# Patient Record
Sex: FEMALE | Race: WHITE | NOT HISPANIC OR LATINO | Employment: OTHER | URBAN - METROPOLITAN AREA
[De-identification: names, ages, dates, MRNs, and addresses within clinical notes are randomized per-mention and may not be internally consistent; named-entity substitution may affect disease eponyms.]

---

## 2017-06-27 ENCOUNTER — OFFICE VISIT (OUTPATIENT)
Dept: HEMATOLOGY/ONCOLOGY | Facility: CLINIC | Age: 57
End: 2017-06-27
Payer: MEDICARE

## 2017-06-27 VITALS
HEIGHT: 60 IN | SYSTOLIC BLOOD PRESSURE: 118 MMHG | DIASTOLIC BLOOD PRESSURE: 76 MMHG | RESPIRATION RATE: 18 BRPM | BODY MASS INDEX: 29.19 KG/M2 | WEIGHT: 148.69 LBS | TEMPERATURE: 98 F | HEART RATE: 74 BPM

## 2017-06-27 DIAGNOSIS — C34.11 PRIMARY CANCER OF RIGHT UPPER LOBE OF LUNG: Chronic | ICD-10-CM

## 2017-06-27 PROCEDURE — 99204 OFFICE O/P NEW MOD 45 MIN: CPT | Mod: ,,, | Performed by: INTERNAL MEDICINE

## 2017-06-27 RX ORDER — LISINOPRIL 5 MG/1
5 TABLET ORAL DAILY
COMMUNITY
End: 2020-06-03

## 2017-06-27 RX ORDER — LEVOTHYROXINE SODIUM 100 UG/1
100 TABLET ORAL DAILY
COMMUNITY

## 2017-06-27 NOTE — PROGRESS NOTES
Subjective:       Patient ID: Ember Delgado is a 56 y.o. female.    Chief Complaint: No chief complaint on file.      Patient is a 55yo female who was diagnosed with right upper lobe lung cancer in 2010.  She underwent neoadjuvant cis/etop/xrt followed by RUL lobectomy.  She has been disease free since that time and has been getting yearly screening CT scans which are negative.          Past Medical History:   Diagnosis Date    Essential hypertension, malignant     Hypothyroid     Lung cancer        Past Surgical History:   Procedure Laterality Date    APPENDECTOMY      Right upper lobectomy.  Right     TUBAL LIGATION Bilateral        Social History     Social History    Marital status: Single     Spouse name: N/A    Number of children: N/A    Years of education: N/A     Social History Main Topics    Smoking status: Former Smoker     Packs/day: 1.30     Types: Cigarettes     Quit date: 2010    Smokeless tobacco: Never Used    Alcohol use No    Drug use: No    Sexual activity: Not Asked     Other Topics Concern    None     Social History Narrative    None       No family history on file.    Review of patient's allergies indicates:  No Known Allergies    Current Outpatient Prescriptions:     levothyroxine (SYNTHROID) 100 MCG tablet, Take 100 mcg by mouth once daily., Disp: , Rfl:     lisinopril (PRINIVIL,ZESTRIL) 5 MG tablet, Take 5 mg by mouth once daily., Disp: , Rfl:     All medications and past history have been reviewed.    Review of Systems   Constitutional: Negative for activity change, appetite change, diaphoresis, fatigue, fever and unexpected weight change.   HENT: Negative for congestion and hearing loss.    Eyes: Negative for visual disturbance.   Respiratory: Negative for cough, chest tightness and shortness of breath.    Cardiovascular: Negative for chest pain and leg swelling.   Gastrointestinal: Negative for abdominal pain, blood in stool, diarrhea, nausea and vomiting.    Endocrine: Negative for cold intolerance and heat intolerance.   Genitourinary: Negative for difficulty urinating, dysuria and hematuria.   Neurological: Negative for dizziness and headaches.   Hematological: Negative for adenopathy. Does not bruise/bleed easily.   Psychiatric/Behavioral: Negative for behavioral problems.       Objective:        /76   Pulse 74   Temp 98.1 °F (36.7 °C) (Oral)   Resp 18   Ht 5' (1.524 m)   Wt 67.4 kg (148 lb 11.2 oz)   BMI 29.04 kg/m²     Physical Exam   Constitutional: She appears well-developed and well-nourished.   HENT:   Head: Normocephalic and atraumatic.   Right Ear: External ear normal.   Left Ear: External ear normal.   Mouth/Throat: Oropharynx is clear and moist.   Eyes: Conjunctivae are normal. Pupils are equal, round, and reactive to light.   Neck: No tracheal deviation present. No thyromegaly present.   Cardiovascular: Normal rate, regular rhythm and normal heart sounds.    Pulmonary/Chest: Effort normal and breath sounds normal.   Abdominal: Soft. Bowel sounds are normal. She exhibits no distension and no mass. There is no tenderness.   Musculoskeletal: She exhibits no edema.   Neurological:   Neuro intact througout   Skin: No rash noted.   Psychiatric: She has a normal mood and affect. Her behavior is normal. Judgment and thought content normal.         Lab  No results found for this or any previous visit (from the past 336 hour(s)).  CMP  No results found for: NA, K, CL, CO2, GLU, BUN, CREATININE, CALCIUM, PROT, ALBUMIN, BILITOT, ALKPHOS, AST, ALT, ANIONGAP, ESTGFRAFRICA, EGFRNONAA      Specimen (12h ago through future)    None                All lab results and imaging results have been reviewed and discussed with the patient.     Assessment:       1. Primary cancer of right upper lobe of lung      Problem List Items Addressed This Visit     Primary cancer of right upper lobe of lung (Chronic)     Stage IIIA NSCLC with sarcomatoid features.   Received  neoadjuvant Cisplstin/Etop/XRT  Surgery 2/2010. Patient had been in remission since that time.     Patient is disease free at this time and is establishing care.  I would continue to get screening chest CT yearly and exams at the same time.  Patient is at some increased risk for the next 10 years given her cessation of smoking in 2010.  She is doing quite well otherwise.             Other Visit Diagnoses    None.       No matching staging information was found for the patient.      Plan:     I have spent greater than 45 minutes.  in the care of this patient discussing the issues reflected in the assessment and plan. Reviewed all old medical records.   Greater than 50% face to face.  All questions answered to the patients satisfaction.      No Follow-up on file.       The plan was discussed with the patient and all questions/concerns have been answered to the patient's satisfaction.

## 2017-06-27 NOTE — ASSESSMENT & PLAN NOTE
Stage IIIA NSCLC with sarcomatoid features.   Received neoadjuvant Cisplstin/Etop/XRT  Surgery 2/2010. Patient had been in remission since that time.     Patient is disease free at this time and is establishing care.  I would continue to get screening chest CT yearly and exams at the same time.  Patient is at some increased risk for the next 10 years given her cessation of smoking in 2010.  She is doing quite well otherwise.

## 2017-07-11 ENCOUNTER — TELEPHONE (OUTPATIENT)
Dept: HEMATOLOGY/ONCOLOGY | Facility: CLINIC | Age: 57
End: 2017-07-11

## 2017-07-11 NOTE — TELEPHONE ENCOUNTER
----- Message from Magaly Flores sent at 7/11/2017  9:56 AM CDT -----  Contact: [phone]   Pt called in to get results of CT of the chest please call pt at # a [phone]

## 2018-08-20 ENCOUNTER — OFFICE VISIT (OUTPATIENT)
Dept: HEMATOLOGY/ONCOLOGY | Facility: CLINIC | Age: 58
End: 2018-08-20
Payer: MEDICARE

## 2018-08-20 VITALS
WEIGHT: 143.19 LBS | TEMPERATURE: 98 F | HEART RATE: 74 BPM | BODY MASS INDEX: 27.97 KG/M2 | DIASTOLIC BLOOD PRESSURE: 84 MMHG | RESPIRATION RATE: 18 BRPM | SYSTOLIC BLOOD PRESSURE: 129 MMHG

## 2018-08-20 DIAGNOSIS — C34.11 PRIMARY CANCER OF RIGHT UPPER LOBE OF LUNG: Chronic | ICD-10-CM

## 2018-08-20 PROCEDURE — 99213 OFFICE O/P EST LOW 20 MIN: CPT | Mod: ,,, | Performed by: INTERNAL MEDICINE

## 2018-08-20 NOTE — LETTER
August 20, 2018      Jared Leon MD  [address]  [address]  [facility]  [place] [address]           [facility]  [address]  [address]  [place] [address]  Phone: [phone]  Fax: [phone]          Patient: Ember Delgado   MR Number: [id number]   YOB: 1960   Date of Visit: 8/20/2018       Dear Dr. Jared Leon:    Thank you for referring Ember Delgado to me for evaluation. Attached you will find relevant portions of my assessment and plan of care.    If you have questions, please do not hesitate to call me. I look forward to following Ember Delgado along with you.    Sincerely,    Denis Varela MD    Enclosure  CC:  No Recipients    If you would like to receive this communication electronically, please contact [email or web address] or [phone] to request more information on [employer] access.    For providers and/or their staff who would like to refer a patient to [facility], please contact us through our one-stop-shop provider referral line, [facility], at [phone].    If you feel you have received this communication in error or would no longer like to receive these types of communications, please e-mail [email or web address]

## 2018-08-20 NOTE — ASSESSMENT & PLAN NOTE
Patient has been doing well this year but still has some risk given her smoking and cancer history.  Will get a yearly CT of the chest and will call patient with this result.  Will continue yearly follow up and monitoring.  Labs look ok as well.

## 2018-08-20 NOTE — PROGRESS NOTES
PROGRESS NOTE    Subjective:       Patient ID: Ember Delgado is a 57 y.o. female.    Chief Complaint:  Follow-up and Results  lung cancer history.   Primary cancer of right upper lobe of lung (Chronic)        Stage IIIA NSCLC with sarcomatoid features.   Received neoadjuvant Cisplstin/Etop/XRT  Surgery 2/2010. Patient had been in remission since that time.           History of Present Illness:   Ember Delgado is a 57 y.o. female who presents for yearly follow up.         Family and Social history reviewed and is unchanged from 2/27/2017.       ROS:  Review of Systems   Constitutional: Negative for fever.   Respiratory: Negative for shortness of breath.    Cardiovascular: Negative for chest pain and leg swelling.   Gastrointestinal: Negative for abdominal pain and blood in stool.   Genitourinary: Negative for hematuria.   Skin: Negative for rash.          Current Outpatient Medications:     amino acids (AMINO ACID ORAL), Take by mouth., Disp: , Rfl:     levothyroxine (SYNTHROID) 100 MCG tablet, Take 100 mcg by mouth once daily., Disp: , Rfl:     lisinopril (PRINIVIL,ZESTRIL) 5 MG tablet, Take 5 mg by mouth once daily., Disp: , Rfl:     omega-3/dha/epa/dpa/fish oil (OMEGA-3 2100 ORAL), Take by mouth., Disp: , Rfl:     UBIQUINOL, BULK, MISC, by Misc.(Non-Drug; Combo Route) route., Disp: , Rfl:         Objective:       Physical Examination:     /84   Pulse 74   Temp 98.4 °F (36.9 °C)   Resp 18   Wt 65 kg (143 lb 3.2 oz)   BMI 27.97 kg/m²     Physical Exam   Constitutional: She appears well-developed and well-nourished.   HENT:   Head: Normocephalic and atraumatic.   Right Ear: External ear normal.   Left Ear: External ear normal.   Mouth/Throat: Oropharynx is clear and moist.   Eyes: Conjunctivae are normal. Pupils are equal, round, and reactive to light.   Neck: No tracheal deviation present. No thyromegaly present.    Cardiovascular: Normal rate, regular rhythm and normal heart sounds.   Pulmonary/Chest: Effort normal and breath sounds normal.   Abdominal: Soft. Bowel sounds are normal. She exhibits no distension and no mass. There is no tenderness.   Musculoskeletal: She exhibits no edema.   Neurological:   Neuro intact througout   Skin: No rash noted.   Psychiatric: She has a normal mood and affect. Her behavior is normal. Judgment and thought content normal.       Labs:   No results found for this or any previous visit (from the past 336 hour(s)).  CMP  No results found for: NA, K, CL, CO2, GLU, BUN, CREATININE, CALCIUM, PROT, ALBUMIN, BILITOT, ALKPHOS, AST, ALT, ANIONGAP, ESTGFRAFRICA, EGFRNONAA  No results found for: CEA  No results found for: PSA        Assessment/Plan:     Problem List Items Addressed This Visit     Primary cancer of right upper lobe of lung (Chronic)     Patient has been doing well this year but still has some risk given her smoking and cancer history.  Will get a yearly CT of the chest and will call patient with this result.  Will continue yearly follow up and monitoring.  Labs look ok as well.           Relevant Orders    CT Chest Without Contrast          Discussion:     Follow-up in about 1 year (around 8/20/2019).      Electronically signed by Denis Ferrara

## 2019-07-03 ENCOUNTER — TELEPHONE (OUTPATIENT)
Dept: HEMATOLOGY/ONCOLOGY | Facility: CLINIC | Age: 59
End: 2019-07-03

## 2019-07-03 NOTE — TELEPHONE ENCOUNTER
NOTIFIED PT PER DR WRIGHT THAT SCAN IS NORMAL, THERE IS NO ABNORMALITY IN THAT AREA. PT VERBALIZED UNDERSTANDING AND WILL KEEP HER APPT IN AUG 2019 TO SEE DR WRIGHT

## 2019-07-03 NOTE — TELEPHONE ENCOUNTER
----- Message from Kathya Armstrong sent at 7/3/2019 11:05 AM CDT -----  Pt had a CT of the chest  On Monday at [facility] due to a lump on her ribs.  Dr. Leon is on vacation.  Pt wants to see if Dr. Varela can get the results and let her know the results.  She would rather get results from Dr. Varela since he is her cancer dr than Dr. Leon.    #[phone]

## 2019-09-17 ENCOUNTER — OFFICE VISIT (OUTPATIENT)
Dept: HEMATOLOGY/ONCOLOGY | Facility: CLINIC | Age: 59
End: 2019-09-17
Payer: MEDICARE

## 2019-09-17 VITALS
WEIGHT: 122 LBS | BODY MASS INDEX: 23.83 KG/M2 | TEMPERATURE: 98 F | SYSTOLIC BLOOD PRESSURE: 109 MMHG | HEART RATE: 96 BPM | DIASTOLIC BLOOD PRESSURE: 68 MMHG | RESPIRATION RATE: 20 BRPM

## 2019-09-17 DIAGNOSIS — C34.11 PRIMARY CANCER OF RIGHT UPPER LOBE OF LUNG: Chronic | ICD-10-CM

## 2019-09-17 PROCEDURE — 99213 PR OFFICE/OUTPT VISIT, EST, LEVL III, 20-29 MIN: ICD-10-PCS | Mod: S$GLB,,, | Performed by: INTERNAL MEDICINE

## 2019-09-17 PROCEDURE — 3008F PR BODY MASS INDEX (BMI) DOCUMENTED: ICD-10-PCS | Mod: S$GLB,,, | Performed by: INTERNAL MEDICINE

## 2019-09-17 PROCEDURE — 3008F BODY MASS INDEX DOCD: CPT | Mod: S$GLB,,, | Performed by: INTERNAL MEDICINE

## 2019-09-17 PROCEDURE — 99213 OFFICE O/P EST LOW 20 MIN: CPT | Mod: S$GLB,,, | Performed by: INTERNAL MEDICINE

## 2019-09-17 NOTE — ASSESSMENT & PLAN NOTE
Patient is doing well and the lump she feels on her chest does not show as abnormal on CT scan.  I discussed that I would have gotten a CT scan at one year anyway and will continue yearly follow up with scan.  Patient EUGENIO at this time.

## 2019-09-17 NOTE — PROGRESS NOTES
PROGRESS NOTE    Subjective:       Patient ID: Ember Delgado is a 59 y.o. female.    Chief Complaint:  Follow-up  lung cancer history.   Primary cancer of right upper lobe of lung (Chronic)        Stage IIIA NSCLC with sarcomatoid features.   Received neoadjuvant Cisplstin/Etop/XRT  Surgery 2/2010. Patient had been in remission since that time.           History of Present Illness:   Ember Delgado is a 59 y.o. female who presents for yearly follow up.     Patient felt a chest lump in the upper out chest in July this year which prompted chest DT done 8/23.        Chest CT 8/23/2019 negative     Family and Social history reviewed and is unchanged from 2/27/2017.       ROS:  Review of Systems   Constitutional: Negative for fever.   Respiratory: Negative for shortness of breath.    Cardiovascular: Negative for chest pain and leg swelling.   Gastrointestinal: Negative for abdominal pain and blood in stool.   Genitourinary: Negative for hematuria.   Skin: Negative for rash.          Current Outpatient Medications:     amino acids (AMINO ACID ORAL), Take by mouth., Disp: , Rfl:     levothyroxine (SYNTHROID) 100 MCG tablet, Take 100 mcg by mouth once daily., Disp: , Rfl:     lisinopril (PRINIVIL,ZESTRIL) 5 MG tablet, Take 5 mg by mouth once daily., Disp: , Rfl:     omega-3/dha/epa/dpa/fish oil (OMEGA-3 2100 ORAL), Take by mouth., Disp: , Rfl:     UBIQUINOL, BULK, MISC, by Misc.(Non-Drug; Combo Route) route., Disp: , Rfl:         Objective:       Physical Examination:     /68   Pulse 96   Temp 98.4 °F (36.9 °C)   Resp 20   Wt 55.3 kg (122 lb)   BMI 23.83 kg/m²     Physical Exam   Constitutional: She appears well-developed and well-nourished.   HENT:   Head: Normocephalic and atraumatic.   Right Ear: External ear normal.   Left Ear: External ear normal.   Mouth/Throat: Oropharynx is clear and moist.   Eyes: Pupils are equal, round, and  reactive to light. Conjunctivae are normal.   Neck: No tracheal deviation present. No thyromegaly present.   Cardiovascular: Normal rate, regular rhythm and normal heart sounds.   Pulmonary/Chest: Effort normal and breath sounds normal.   Abdominal: Soft. Bowel sounds are normal. She exhibits no distension and no mass. There is no tenderness.   Musculoskeletal: She exhibits no edema.   Neurological:   Neuro intact througout   Skin: No rash noted.   Psychiatric: She has a normal mood and affect. Her behavior is normal. Judgment and thought content normal.       Labs:   No results found for this or any previous visit (from the past 336 hour(s)).  CMP  No results found for: NA, K, CL, CO2, GLU, BUN, CREATININE, CALCIUM, PROT, ALBUMIN, BILITOT, ALKPHOS, AST, ALT, ANIONGAP, ESTGFRAFRICA, EGFRNONAA  No results found for: CEA  No results found for: PSA        Assessment/Plan:     Problem List Items Addressed This Visit     Primary cancer of right upper lobe of lung (Chronic)     Patient is doing well and the lump she feels on her chest does not show as abnormal on CT scan.  I discussed that I would have gotten a CT scan at one year anyway and will continue yearly follow up with scan.  Patient EUGENIO at this time.          Relevant Orders    CT Chest Without Contrast          Discussion:     Follow up in about 1 year (around 9/17/2020).      Electronically signed by Denis Ferrara

## 2019-12-16 ENCOUNTER — TELEPHONE (OUTPATIENT)
Dept: FAMILY MEDICINE | Facility: CLINIC | Age: 59
End: 2019-12-16

## 2019-12-16 RX ORDER — TIZANIDINE 4 MG/1
4 TABLET ORAL DAILY
Qty: 30 TABLET | Refills: 0 | Status: SHIPPED | OUTPATIENT
Start: 2019-12-16 | End: 2019-12-19 | Stop reason: SDUPTHER

## 2019-12-16 NOTE — TELEPHONE ENCOUNTER
We haven't seen this since 2018. Spoke with pt - states she takes it as needed. States she was prescribed Xanax but she does not want to take that. Wants to take the Zanaflex instead, takes it rarely to help her sleep

## 2019-12-16 NOTE — TELEPHONE ENCOUNTER
----- Message from Ashley Alvarado sent at 12/16/2019 10:11 AM CST -----  Patient needs a refill tizanidine sent to [facility] patients call back number [phone]

## 2019-12-19 RX ORDER — TIZANIDINE 4 MG/1
4 TABLET ORAL DAILY
Qty: 30 TABLET | Refills: 0 | Status: SHIPPED | OUTPATIENT
Start: 2019-12-19 | End: 2020-01-18

## 2019-12-19 NOTE — TELEPHONE ENCOUNTER
----- Message from Dona Tiwari sent at 12/19/2019 10:48 AM CST -----  Contact: pt  Pt called a couple days ago to get a refill and the pharmacy still hasn't received it    Tizanidine 2mg    [facility]    [phone]

## 2020-01-14 ENCOUNTER — TELEPHONE (OUTPATIENT)
Dept: FAMILY MEDICINE | Facility: CLINIC | Age: 60
End: 2020-01-14

## 2020-01-14 NOTE — TELEPHONE ENCOUNTER
From action in [facility], results look like a benign complicated cyst is present, recheck in 6mths Diagnostic U/S per Dr. Leon.

## 2020-01-29 ENCOUNTER — TELEPHONE (OUTPATIENT)
Dept: FAMILY MEDICINE | Facility: CLINIC | Age: 60
End: 2020-01-29

## 2020-01-29 NOTE — TELEPHONE ENCOUNTER
Per [facility] action, Dr. Leon states this pt needs f/u right breast us due to cysts this spring. Set out action til then.

## 2020-06-02 ENCOUNTER — TELEPHONE (OUTPATIENT)
Dept: FAMILY MEDICINE | Facility: CLINIC | Age: 60
End: 2020-06-02

## 2020-06-02 NOTE — TELEPHONE ENCOUNTER
----- Message from Jennifer Betancourt sent at 6/2/2020  1:17 PM CDT -----  VM @ 12:21 retunring a call from Katie freire # [phone]

## 2020-06-03 RX ORDER — LANOLIN ALCOHOL/MO/W.PET/CERES
400 CREAM (GRAM) TOPICAL
COMMUNITY
End: 2024-01-26

## 2020-06-03 RX ORDER — MULTIVITAMIN
1 TABLET ORAL
COMMUNITY

## 2020-06-09 ENCOUNTER — OFFICE VISIT (OUTPATIENT)
Dept: FAMILY MEDICINE | Facility: CLINIC | Age: 60
End: 2020-06-09
Payer: MEDICARE

## 2020-06-09 DIAGNOSIS — C34.11 PRIMARY CANCER OF RIGHT UPPER LOBE OF LUNG: Chronic | ICD-10-CM

## 2020-06-09 DIAGNOSIS — I10 ESSENTIAL HYPERTENSION: Primary | ICD-10-CM

## 2020-06-09 DIAGNOSIS — R92.8 ABNORMAL MAMMOGRAM: Primary | ICD-10-CM

## 2020-06-09 DIAGNOSIS — Z12.31 OTHER SCREENING MAMMOGRAM: ICD-10-CM

## 2020-06-09 DIAGNOSIS — E03.9 ACQUIRED HYPOTHYROIDISM: ICD-10-CM

## 2020-06-09 PROCEDURE — 99213 PR OFFICE/OUTPT VISIT, EST, LEVL III, 20-29 MIN: ICD-10-PCS | Mod: 95,,, | Performed by: FAMILY MEDICINE

## 2020-06-09 PROCEDURE — 99213 OFFICE O/P EST LOW 20 MIN: CPT | Mod: 95,,, | Performed by: FAMILY MEDICINE

## 2020-06-09 RX ORDER — LOSARTAN POTASSIUM 25 MG/1
25 TABLET ORAL DAILY
Qty: 90 TABLET | Refills: 3 | Status: SHIPPED | OUTPATIENT
Start: 2020-06-09 | End: 2021-06-14 | Stop reason: SDUPTHER

## 2020-06-09 NOTE — PROGRESS NOTES
Subjective:        The chief complaint leading to consultation is:  Lung cancer follow-up  The patient location is:  Home  Visit type: Virtual visit with synchronous audio/video or audio only  This was a video visit in lieu of in-person visit due to the coronavirus emergency. Patient acknowledged and consented to the video visit encounter.     This 59-year-old female is here for a telemedicine visit for her 6 month checkup.  She has a history of lung carcinoma status post lobectomy.  He follows with Dr. Ferrara yearly for  cancer screening.  She feels well and has been staying active.  Before the COVID virus crisis she was playing volleyball in the league.  Now she walks 1 mi on her 5 acre property at home for exercise.    She denies chest pain shortness of breath or any upper respiratory infection symptoms.    Colonoscopy -2016-Dr. Milner RTC 5 years.    She sees Dr. Ortega for Endocrinology and thyroid management.  She is taking a combination pill of levothyroxine and liothyronine 176/25 mcg 1 p.o. q.day.      Past Surgical History:   Procedure Laterality Date    APPENDECTOMY      Right upper lobectomy.  Right     TUBAL LIGATION Bilateral      Past Medical History:   Diagnosis Date    Essential hypertension, malignant     Hypothyroid     Lung cancer      No family history on file.     Social History:   Marital Status: Significant Other  Alcohol History:  reports that she does not drink alcohol.  Tobacco History:  reports that she quit smoking about 10 years ago. Her smoking use included cigarettes. She smoked 1.30 packs per day. She has never used smokeless tobacco.  Drug History:  reports that she does not use drugs.    Review of patient's allergies indicates:  No Known Allergies    Current Outpatient Medications   Medication Sig Dispense Refill    amino acids (AMINO ACID ORAL) Take by mouth.      levothyroxine (SYNTHROID) 100 MCG tablet Take 100 mcg by mouth once daily.      magnesium oxide  (MAG-OX) 400 mg (241.3 mg magnesium) tablet Take 400 mg by mouth.      multivitamin (THERAGRAN) per tablet Take 1 tablet by mouth.      omega-3/dha/epa/dpa/fish oil (OMEGA-3 2100 ORAL) Take by mouth.      UBIQUINOL, BULK, MISC by Misc.(Non-Drug; Combo Route) route.      losartan (COZAAR) 25 MG tablet Take 1 tablet (25 mg total) by mouth once daily. 90 tablet 3     No current facility-administered medications for this visit.        Review of Systems   Constitutional: Negative for appetite change, chills, fatigue, fever and unexpected weight change.   HENT: Negative for congestion, ear pain, sinus pain, sore throat and trouble swallowing.    Eyes: Negative for pain, discharge and visual disturbance.   Respiratory: Negative for apnea, cough, shortness of breath and wheezing.    Cardiovascular: Negative for chest pain, palpitations and leg swelling.   Gastrointestinal: Negative for abdominal pain, blood in stool, constipation, diarrhea, nausea and vomiting.   Endocrine: Negative for heat intolerance, polydipsia and polyuria.   Genitourinary: Negative for difficulty urinating, dyspareunia, dysuria, frequency, hematuria and menstrual problem.   Musculoskeletal: Negative for arthralgias, back pain, gait problem, joint swelling and myalgias.   Allergic/Immunologic: Negative for environmental allergies, food allergies and immunocompromised state.   Neurological: Negative for dizziness, tremors, seizures, numbness and headaches.   Psychiatric/Behavioral: Negative for behavioral problems, confusion, hallucinations and suicidal ideas. The patient is not nervous/anxious.          Objective:        Physical Exam:   Physical Exam         Assessment:       1. Essential hypertension    2. Other screening mammogram    3. Primary cancer of right upper lobe of lung    4. Acquired hypothyroidism      Plan:   Essential hypertension  -     losartan (COZAAR) 25 MG tablet; Take 1 tablet (25 mg total) by mouth once daily.  Dispense: 90  tablet; Refill: 3  Currently taking losartan 25 mg daily.  Other screening mammogram  -     Mammo Digital Screening Bilat w/ Jeison; Future; Expected date: 06/10/2020  Mammogram ordered  Primary cancer of right upper lobe of lung  -     X-Ray Chest PA And Lateral; Future; Expected date: 06/09/2020   Recommend yearly chest x-ray, see Dr. Ferrara as scheduled this summer  Acquired hypothyroidism  Continue Dr. Andrade stock thyroid preparations 1 tablet daily    No follow-ups on file.    Total time spent with patient:  20 min    Each patient to whom he or she provides medical services by telemedicine is:  (1) informed of the relationship between the physician and patient and the respective role of any other health care provider with respect to management of the patient; and (2) notified that he or she may decline to receive medical services by telemedicine and may withdraw from such care at any time.    This note was created using [employer] voice recognition software that occasionally misinterprets phrases or words.

## 2020-06-11 ENCOUNTER — TELEPHONE (OUTPATIENT)
Dept: FAMILY MEDICINE | Facility: CLINIC | Age: 60
End: 2020-06-11

## 2020-06-11 NOTE — TELEPHONE ENCOUNTER
----- Message from Jared Leon MD sent at 6/9/2020  8:55 PM CDT -----  Call patient.  Chest x-ray mammogram in been ordered she needs a 1 year follow-up

## 2020-07-13 ENCOUNTER — HOSPITAL ENCOUNTER (OUTPATIENT)
Dept: RADIOLOGY | Facility: HOSPITAL | Age: 60
Discharge: HOME | End: 2020-07-13
Attending: FAMILY MEDICINE
Payer: MEDICARE

## 2020-07-13 VITALS — BODY MASS INDEX: 23.94 KG/M2 | WEIGHT: 121.94 LBS | HEIGHT: 60 IN

## 2020-07-13 DIAGNOSIS — R92.8 ABNORMAL MAMMOGRAM: ICD-10-CM

## 2020-07-13 DIAGNOSIS — C34.11 PRIMARY CANCER OF RIGHT UPPER LOBE OF LUNG: Chronic | ICD-10-CM

## 2020-07-13 PROCEDURE — 71046 X-RAY EXAM CHEST 2 VIEWS: CPT | Mod: TC,PO

## 2020-07-13 PROCEDURE — 77066 DX MAMMO INCL CAD BI: CPT | Mod: TC,PO

## 2020-07-13 PROCEDURE — 76642 ULTRASOUND BREAST LIMITED: CPT | Mod: TC,PO,RT

## 2020-07-20 ENCOUNTER — TELEPHONE (OUTPATIENT)
Dept: FAMILY MEDICINE | Facility: CLINIC | Age: 60
End: 2020-07-20

## 2020-07-20 NOTE — TELEPHONE ENCOUNTER
Spoke to patient with results verbatim per Dr Leon. Said that she thinks the radiologist mentioned a 3 month follow-up, not 6 month. Would this be ok? I didn't see on the result where it specified a time frame. Patient also wants to know if Dr Leon thinks she should get biopsy or wait for follow-up ultrasound?

## 2020-07-20 NOTE — TELEPHONE ENCOUNTER
----- Message from Jared Leon MD sent at 7/19/2020  6:30 PM CDT -----  Call patient.  Ultrasound of the right breast shows a stable cyst at 2 o clock . Position.  There is also a new 5 mm cyst is seen in the right breast.  A six-month ultrasound can be done to follow-up this up or you may choose to have a biopsy.

## 2020-07-20 NOTE — PROGRESS NOTES
"Spoke to patient with information verbatim per Dr Leon. Verbalized understanding we will call her in 3 months for repeat ultrasound. Remind me created.    Katie Myrick MA 46 minutes ago (3:12 PM)  Per Dr. Leon, "okay for 3 mo f/u. Lets just do ultrasound and not biopsy yet."      "

## 2020-07-20 NOTE — TELEPHONE ENCOUNTER
----- Message from Jared Leon MD sent at 7/19/2020  6:26 PM CDT -----  Call patient.  Her chest x-ray from July 2 shows no lung tumors or new masses.  Just old postsurgical changes from her prior surgery are seen.

## 2020-07-28 ENCOUNTER — TELEPHONE (OUTPATIENT)
Dept: FAMILY MEDICINE | Facility: CLINIC | Age: 60
End: 2020-07-28

## 2020-07-28 NOTE — TELEPHONE ENCOUNTER
----- Message from Haresh Mariscal sent at 7/28/2020  9:34 AM CDT -----  Regarding: needing callback  Pt is stating that she is unsure if she needing a mammo or u/s for 3months f.u please call pt to verify   Pt [phone]

## 2020-07-28 NOTE — TELEPHONE ENCOUNTER
Spoke with pt, she was confused about the order, I let her know that Dr. Leon wanted an u/s not a biopsy in 3 months. Pt wanted to know what she needs to do and I let her know we put a reminder in her chart for 3 months and we would call her at that time and get her scheduled.

## 2020-10-06 ENCOUNTER — OFFICE VISIT (OUTPATIENT)
Dept: HEMATOLOGY/ONCOLOGY | Facility: CLINIC | Age: 60
End: 2020-10-06
Payer: MEDICARE

## 2020-10-06 VITALS
DIASTOLIC BLOOD PRESSURE: 76 MMHG | RESPIRATION RATE: 18 BRPM | TEMPERATURE: 98 F | SYSTOLIC BLOOD PRESSURE: 112 MMHG | BODY MASS INDEX: 28.14 KG/M2 | WEIGHT: 144.13 LBS | HEART RATE: 80 BPM

## 2020-10-06 DIAGNOSIS — C34.11 PRIMARY CANCER OF RIGHT UPPER LOBE OF LUNG: Chronic | ICD-10-CM

## 2020-10-06 PROCEDURE — 3008F PR BODY MASS INDEX (BMI) DOCUMENTED: ICD-10-PCS | Mod: S$GLB,,, | Performed by: INTERNAL MEDICINE

## 2020-10-06 PROCEDURE — 3078F PR MOST RECENT DIASTOLIC BLOOD PRESSURE < 80 MM HG: ICD-10-PCS | Mod: S$GLB,,, | Performed by: INTERNAL MEDICINE

## 2020-10-06 PROCEDURE — 99213 PR OFFICE/OUTPT VISIT, EST, LEVL III, 20-29 MIN: ICD-10-PCS | Mod: S$GLB,,, | Performed by: INTERNAL MEDICINE

## 2020-10-06 PROCEDURE — 3078F DIAST BP <80 MM HG: CPT | Mod: S$GLB,,, | Performed by: INTERNAL MEDICINE

## 2020-10-06 PROCEDURE — 99213 OFFICE O/P EST LOW 20 MIN: CPT | Mod: S$GLB,,, | Performed by: INTERNAL MEDICINE

## 2020-10-06 PROCEDURE — 3074F SYST BP LT 130 MM HG: CPT | Mod: S$GLB,,, | Performed by: INTERNAL MEDICINE

## 2020-10-06 PROCEDURE — 3074F PR MOST RECENT SYSTOLIC BLOOD PRESSURE < 130 MM HG: ICD-10-PCS | Mod: S$GLB,,, | Performed by: INTERNAL MEDICINE

## 2020-10-06 PROCEDURE — 3008F BODY MASS INDEX DOCD: CPT | Mod: S$GLB,,, | Performed by: INTERNAL MEDICINE

## 2020-10-06 NOTE — ASSESSMENT & PLAN NOTE
Patient is doing well and appears EUGENIO.  She stopped smoking in 2009 and did not resume.  I discussed that she should still be getting yearly imaging and will order this today.  Will continue to see her yearly.

## 2020-10-06 NOTE — PROGRESS NOTES
PROGRESS NOTE    Subjective:       Patient ID: Ember Delgado is a 60 y.o. female.    Chief Complaint:  No chief complaint on file.  lung cancer history.   Primary cancer of right upper lobe of lung (Chronic)        Stage IIIA NSCLC with sarcomatoid features.   Received neoadjuvant Cisplstin/Etop/XRT  Surgery 2/2010. Patient had been in remission since that time.           History of Present Illness:   Ember Delgado is a 60 y.o. female who presents for yearly follow up.         Patient has no new complaints at this time.  Feeling well.       CXR negative 7/13/2020    Family and Social history reviewed and is unchanged from 2/27/2017.       ROS:  Review of Systems   Constitutional: Negative for fever.   Respiratory: Negative for shortness of breath.    Cardiovascular: Negative for chest pain and leg swelling.   Gastrointestinal: Negative for abdominal pain and blood in stool.   Genitourinary: Negative for hematuria.   Skin: Negative for rash.          Current Outpatient Medications:     amino acids (AMINO ACID ORAL), Take by mouth., Disp: , Rfl:     levothyroxine (SYNTHROID) 100 MCG tablet, Take 100 mcg by mouth once daily., Disp: , Rfl:     losartan (COZAAR) 25 MG tablet, Take 1 tablet (25 mg total) by mouth once daily., Disp: 90 tablet, Rfl: 3    magnesium oxide (MAG-OX) 400 mg (241.3 mg magnesium) tablet, Take 400 mg by mouth., Disp: , Rfl:     multivitamin (THERAGRAN) per tablet, Take 1 tablet by mouth., Disp: , Rfl:     omega-3/dha/epa/dpa/fish oil (OMEGA-3 2100 ORAL), Take by mouth., Disp: , Rfl:     UBIQUINOL, BULK, MISC, by Misc.(Non-Drug; Combo Route) route., Disp: , Rfl:         Objective:       Physical Examination:     /76   Pulse 80   Temp 97.9 °F (36.6 °C)   Resp 18   Wt 65.4 kg (144 lb 1.6 oz)   BMI 28.14 kg/m²     Physical Exam  Constitutional:       Appearance: She is well-developed.   HENT:      Head:  Normocephalic and atraumatic.      Right Ear: External ear normal.      Left Ear: External ear normal.   Eyes:      Conjunctiva/sclera: Conjunctivae normal.      Pupils: Pupils are equal, round, and reactive to light.   Neck:      Thyroid: No thyromegaly.      Trachea: No tracheal deviation.   Cardiovascular:      Rate and Rhythm: Normal rate and regular rhythm.      Heart sounds: Normal heart sounds.   Pulmonary:      Effort: Pulmonary effort is normal.      Breath sounds: Normal breath sounds.   Abdominal:      General: Bowel sounds are normal. There is no distension.      Palpations: Abdomen is soft. There is no mass.      Tenderness: There is no abdominal tenderness.   Skin:     Findings: No rash.   Neurological:      Comments: Neuro intact througout   Psychiatric:         Behavior: Behavior normal.         Thought Content: Thought content normal.         Judgment: Judgment normal.         Labs:   No results found for this or any previous visit (from the past 336 hour(s)).  CMP  No results found for: NA, K, CL, CO2, GLU, BUN, CREATININE, CALCIUM, PROT, ALBUMIN, BILITOT, ALKPHOS, AST, ALT, ANIONGAP, ESTGFRAFRICA, EGFRNONAA  No results found for: CEA  No results found for: PSA        Assessment/Plan:     Problem List Items Addressed This Visit     Primary cancer of right upper lobe of lung (Chronic)     Patient is doing well and appears EUGENIO.  She stopped smoking in 2009 and did not resume.  I discussed that she should still be getting yearly imaging and will order this today.  Will continue to see her yearly.           Relevant Orders    CT Chest Without Contrast          Discussion:     Follow up in about 1 year (around 10/6/2021).      Electronically signed by Denis Ferrara

## 2020-10-13 ENCOUNTER — TELEPHONE (OUTPATIENT)
Dept: FAMILY MEDICINE | Facility: CLINIC | Age: 60
End: 2020-10-13

## 2020-10-13 DIAGNOSIS — N60.01 CYST OF RIGHT BREAST: Primary | ICD-10-CM

## 2020-10-13 NOTE — TELEPHONE ENCOUNTER
Spoke to patient that repeat breast ultrasound is due and order was being sent to  [facility]. Informed her that if they do not call to schedule in the next couple of days to call them. Gave pt SMI number.

## 2020-10-13 NOTE — TELEPHONE ENCOUNTER
----- Message from [facility],  sent at 7/20/2020  3:57 PM CDT -----  Regarding: Ultrasound due  7/20/20 repeat right breast ultrasound due.

## 2020-11-06 ENCOUNTER — HOSPITAL ENCOUNTER (OUTPATIENT)
Dept: RADIOLOGY | Facility: HOSPITAL | Age: 60
Discharge: HOME | End: 2020-11-06
Attending: FAMILY MEDICINE
Payer: MEDICARE

## 2020-11-06 DIAGNOSIS — N60.01 CYST OF RIGHT BREAST: ICD-10-CM

## 2020-11-06 PROCEDURE — 76642 ULTRASOUND BREAST LIMITED: CPT | Mod: TC,PO,RT

## 2020-11-09 ENCOUNTER — TELEPHONE (OUTPATIENT)
Dept: FAMILY MEDICINE | Facility: CLINIC | Age: 60
End: 2020-11-09

## 2020-11-09 NOTE — TELEPHONE ENCOUNTER
Spoke to patient with results verbatim per Dr Leon. Verbalized understanding that ultrasound will be due with mammogram in July 2021. Remind me created.

## 2020-11-09 NOTE — TELEPHONE ENCOUNTER
----- Message from Jared Leon MD sent at 11/8/2020  8:53 PM CST -----  Call patient.  Breast ultrasound shows a nodule in the right breast but it is smaller than last exam.  Needs repeat ultrasound in July of 2021 after her mammogram at that time.  Probably a benign nodule

## 2020-11-12 ENCOUNTER — HOSPITAL ENCOUNTER (OUTPATIENT)
Dept: RADIOLOGY | Facility: HOSPITAL | Age: 60
Discharge: HOME | End: 2020-11-12
Attending: INTERNAL MEDICINE
Payer: MEDICARE

## 2020-11-12 DIAGNOSIS — C34.11 PRIMARY CANCER OF RIGHT UPPER LOBE OF LUNG: Chronic | ICD-10-CM

## 2020-11-12 PROCEDURE — 71250 CT THORAX DX C-: CPT | Mod: TC,PO

## 2021-02-03 ENCOUNTER — TELEPHONE (OUTPATIENT)
Dept: FAMILY MEDICINE | Facility: CLINIC | Age: 61
End: 2021-02-03

## 2021-02-03 DIAGNOSIS — Z12.11 SPECIAL SCREENING FOR MALIGNANT NEOPLASMS, COLON: Primary | ICD-10-CM

## 2021-05-27 ENCOUNTER — TELEPHONE (OUTPATIENT)
Dept: FAMILY MEDICINE | Facility: CLINIC | Age: 61
End: 2021-05-27

## 2021-05-27 DIAGNOSIS — I10 ESSENTIAL HYPERTENSION: ICD-10-CM

## 2021-05-27 DIAGNOSIS — Z00.00 ROUTINE GENERAL MEDICAL EXAMINATION AT A HEALTH CARE FACILITY: Primary | ICD-10-CM

## 2021-05-27 DIAGNOSIS — Z79.899 ENCOUNTER FOR LONG-TERM (CURRENT) USE OF OTHER MEDICATIONS: ICD-10-CM

## 2021-05-27 DIAGNOSIS — E03.9 ACQUIRED HYPOTHYROIDISM: ICD-10-CM

## 2021-05-27 DIAGNOSIS — Z12.11 SPECIAL SCREENING FOR MALIGNANT NEOPLASMS, COLON: ICD-10-CM

## 2021-06-14 ENCOUNTER — OFFICE VISIT (OUTPATIENT)
Dept: FAMILY MEDICINE | Facility: CLINIC | Age: 61
End: 2021-06-14
Payer: MEDICARE

## 2021-06-14 ENCOUNTER — TELEPHONE (OUTPATIENT)
Dept: FAMILY MEDICINE | Facility: CLINIC | Age: 61
End: 2021-06-14

## 2021-06-14 VITALS
DIASTOLIC BLOOD PRESSURE: 82 MMHG | HEART RATE: 78 BPM | HEIGHT: 60 IN | SYSTOLIC BLOOD PRESSURE: 132 MMHG | BODY MASS INDEX: 28.07 KG/M2 | WEIGHT: 143 LBS

## 2021-06-14 DIAGNOSIS — Z12.31 OTHER SCREENING MAMMOGRAM: ICD-10-CM

## 2021-06-14 DIAGNOSIS — Z12.11 SCREENING FOR COLON CANCER: ICD-10-CM

## 2021-06-14 DIAGNOSIS — Z85.118 HISTORY OF LUNG CANCER: ICD-10-CM

## 2021-06-14 DIAGNOSIS — Z00.00 WELLNESS EXAMINATION: Primary | ICD-10-CM

## 2021-06-14 DIAGNOSIS — E03.9 ACQUIRED HYPOTHYROIDISM: ICD-10-CM

## 2021-06-14 DIAGNOSIS — I10 ESSENTIAL HYPERTENSION: ICD-10-CM

## 2021-06-14 DIAGNOSIS — Z12.39 ENCOUNTER FOR SCREENING FOR MALIGNANT NEOPLASM OF BREAST, UNSPECIFIED SCREENING MODALITY: ICD-10-CM

## 2021-06-14 PROBLEM — C34.11 PRIMARY CANCER OF RIGHT UPPER LOBE OF LUNG: Chronic | Status: RESOLVED | Noted: 2017-06-27 | Resolved: 2021-06-14

## 2021-06-14 PROCEDURE — 99396 PR PREVENTIVE VISIT,EST,40-64: ICD-10-PCS | Mod: S$GLB,,, | Performed by: FAMILY MEDICINE

## 2021-06-14 PROCEDURE — 3075F SYST BP GE 130 - 139MM HG: CPT | Mod: S$GLB,,, | Performed by: FAMILY MEDICINE

## 2021-06-14 PROCEDURE — 3079F DIAST BP 80-89 MM HG: CPT | Mod: S$GLB,,, | Performed by: FAMILY MEDICINE

## 2021-06-14 PROCEDURE — 3079F PR MOST RECENT DIASTOLIC BLOOD PRESSURE 80-89 MM HG: ICD-10-PCS | Mod: S$GLB,,, | Performed by: FAMILY MEDICINE

## 2021-06-14 PROCEDURE — 3075F PR MOST RECENT SYSTOLIC BLOOD PRESS GE 130-139MM HG: ICD-10-PCS | Mod: S$GLB,,, | Performed by: FAMILY MEDICINE

## 2021-06-14 PROCEDURE — 99396 PREV VISIT EST AGE 40-64: CPT | Mod: S$GLB,,, | Performed by: FAMILY MEDICINE

## 2021-06-14 PROCEDURE — 3008F PR BODY MASS INDEX (BMI) DOCUMENTED: ICD-10-PCS | Mod: S$GLB,,, | Performed by: FAMILY MEDICINE

## 2021-06-14 PROCEDURE — 3008F BODY MASS INDEX DOCD: CPT | Mod: S$GLB,,, | Performed by: FAMILY MEDICINE

## 2021-06-14 RX ORDER — LOSARTAN POTASSIUM 25 MG/1
25 TABLET ORAL DAILY
Qty: 90 TABLET | Refills: 3 | Status: SHIPPED | OUTPATIENT
Start: 2021-06-14 | End: 2022-06-15 | Stop reason: SDUPTHER

## 2021-06-30 ENCOUNTER — TELEPHONE (OUTPATIENT)
Dept: FAMILY MEDICINE | Facility: CLINIC | Age: 61
End: 2021-06-30

## 2021-06-30 DIAGNOSIS — N60.01 SOLITARY BENIGN CYST OF RIGHT BREAST: Primary | ICD-10-CM

## 2021-06-30 DIAGNOSIS — N60.01 CYST OF RIGHT BREAST: Primary | ICD-10-CM

## 2021-07-28 ENCOUNTER — HOSPITAL ENCOUNTER (OUTPATIENT)
Dept: RADIOLOGY | Facility: HOSPITAL | Age: 61
Discharge: HOME | End: 2021-07-28
Attending: NURSE PRACTITIONER
Payer: MEDICARE

## 2021-07-28 VITALS — BODY MASS INDEX: 28.09 KG/M2 | WEIGHT: 143.06 LBS | HEIGHT: 60 IN

## 2021-07-28 DIAGNOSIS — N60.01 CYST OF RIGHT BREAST: ICD-10-CM

## 2021-07-28 DIAGNOSIS — N60.01 SOLITARY BENIGN CYST OF RIGHT BREAST: ICD-10-CM

## 2021-07-28 PROCEDURE — 77066 DX MAMMO INCL CAD BI: CPT | Mod: TC,PO

## 2021-07-28 PROCEDURE — 76642 ULTRASOUND BREAST LIMITED: CPT | Mod: TC,PO,RT

## 2021-08-02 ENCOUNTER — TELEPHONE (OUTPATIENT)
Dept: FAMILY MEDICINE | Facility: CLINIC | Age: 61
End: 2021-08-02

## 2021-08-09 ENCOUNTER — TELEPHONE (OUTPATIENT)
Dept: FAMILY MEDICINE | Facility: CLINIC | Age: 61
End: 2021-08-09

## 2022-01-06 ENCOUNTER — OFFICE VISIT (OUTPATIENT)
Dept: HEMATOLOGY/ONCOLOGY | Facility: CLINIC | Age: 62
End: 2022-01-06
Payer: MEDICARE

## 2022-01-06 VITALS
WEIGHT: 140 LBS | SYSTOLIC BLOOD PRESSURE: 124 MMHG | HEIGHT: 60 IN | BODY MASS INDEX: 27.48 KG/M2 | HEART RATE: 76 BPM | RESPIRATION RATE: 18 BRPM | DIASTOLIC BLOOD PRESSURE: 78 MMHG

## 2022-01-06 DIAGNOSIS — Z85.118 HISTORY OF LUNG CANCER: ICD-10-CM

## 2022-01-06 PROCEDURE — 3008F BODY MASS INDEX DOCD: CPT | Mod: S$GLB,,, | Performed by: INTERNAL MEDICINE

## 2022-01-06 PROCEDURE — 99213 PR OFFICE/OUTPT VISIT, EST, LEVL III, 20-29 MIN: ICD-10-PCS | Mod: S$GLB,,, | Performed by: INTERNAL MEDICINE

## 2022-01-06 PROCEDURE — 3074F SYST BP LT 130 MM HG: CPT | Mod: S$GLB,,, | Performed by: INTERNAL MEDICINE

## 2022-01-06 PROCEDURE — 3078F PR MOST RECENT DIASTOLIC BLOOD PRESSURE < 80 MM HG: ICD-10-PCS | Mod: S$GLB,,, | Performed by: INTERNAL MEDICINE

## 2022-01-06 PROCEDURE — 99213 OFFICE O/P EST LOW 20 MIN: CPT | Mod: S$GLB,,, | Performed by: INTERNAL MEDICINE

## 2022-01-06 PROCEDURE — 3008F PR BODY MASS INDEX (BMI) DOCUMENTED: ICD-10-PCS | Mod: S$GLB,,, | Performed by: INTERNAL MEDICINE

## 2022-01-06 PROCEDURE — 3078F DIAST BP <80 MM HG: CPT | Mod: S$GLB,,, | Performed by: INTERNAL MEDICINE

## 2022-01-06 PROCEDURE — 3074F PR MOST RECENT SYSTOLIC BLOOD PRESSURE < 130 MM HG: ICD-10-PCS | Mod: S$GLB,,, | Performed by: INTERNAL MEDICINE

## 2022-01-06 NOTE — PROGRESS NOTES
PROGRESS NOTE    Subjective:       Patient ID: Ember Delgado is a 61 y.o. female.    Chief Complaint:  No chief complaint on file.  lung cancer history.   Primary cancer of right upper lobe of lung (Chronic)        Stage IIIA NSCLC with sarcomatoid features.   Received neoadjuvant Cisplstin/Etop/XRT  Surgery 2/2010. Patient had been in remission since that time.           History of Present Illness:   Ember Delgado is a 61 y.o. female who presents for yearly follow up.     Ms. Delgado has no new complaints at this time.  Feeling ok.    She does note that she has recurrent issues with back spasms.  This is not a new complaint however.     CT chest:  11/12/2020 Neg    Family and Social history reviewed and is unchanged from 2/27/2017.       ROS:  Review of Systems   Constitutional: Negative for fever.   Respiratory: Negative for shortness of breath.    Cardiovascular: Negative for chest pain and leg swelling.   Gastrointestinal: Negative for abdominal pain and blood in stool.   Genitourinary: Negative for hematuria.   Skin: Negative for rash.          Current Outpatient Medications:     amino acids (AMINO ACID ORAL), Take by mouth., Disp: , Rfl:     levothyroxine (SYNTHROID) 100 MCG tablet, Take 100 mcg by mouth once daily., Disp: , Rfl:     losartan (COZAAR) 25 MG tablet, Take 1 tablet (25 mg total) by mouth once daily., Disp: 90 tablet, Rfl: 3    magnesium oxide (MAG-OX) 400 mg (241.3 mg magnesium) tablet, Take 400 mg by mouth., Disp: , Rfl:     multivitamin (THERAGRAN) per tablet, Take 1 tablet by mouth., Disp: , Rfl:     omega-3/dha/epa/dpa/fish oil (OMEGA-3 2100 ORAL), Take by mouth., Disp: , Rfl:     UBIQUINOL, BULK, MISC, by Misc.(Non-Drug; Combo Route) route., Disp: , Rfl:         Objective:       Physical Examination:     /78   Pulse 76   Resp 18   Ht 5' (1.524 m)   Wt 63.5 kg (140 lb)   BMI 27.34 kg/m²     Physical  February 23, 2018      Montse Sarmiento, NP  [address] [facility] [address]           [facility]  [address], [address]  [place] [address]  Phone: [phone]          Patient: Indira Chauhan   MR Number: [id number]   YOB: 1953   Date of Visit: 2/21/2018       Dear Montse Sarmiento:    Thank you for referring Indira Chauhan to me for evaluation. Attached you will find relevant portions of my assessment and plan of care.    If you have questions, please do not hesitate to call me. I look forward to following Indira Chauhan along with you.    Sincerely,    Samira Garcai MD    Enclosure  CC:  No Recipients    If you would like to receive this communication electronically, please contact [email or web address] or [phone] to request more information on [facility] access.    For providers and/or their staff who would like to refer a patient to [facility], please contact us through our one-stop-shop provider referral line, [facility] , at [phone].    If you feel you have received this communication in error or would no longer like to receive these types of communications, please e-mail [email or web address]          Exam  Constitutional:       Appearance: She is well-developed.   HENT:      Head: Normocephalic and atraumatic.      Right Ear: External ear normal.      Left Ear: External ear normal.   Eyes:      Conjunctiva/sclera: Conjunctivae normal.      Pupils: Pupils are equal, round, and reactive to light.   Neck:      Thyroid: No thyromegaly.      Trachea: No tracheal deviation.   Cardiovascular:      Rate and Rhythm: Normal rate and regular rhythm.      Heart sounds: Normal heart sounds.   Pulmonary:      Effort: Pulmonary effort is normal.      Breath sounds: Normal breath sounds.   Abdominal:      General: Bowel sounds are normal. There is no distension.      Palpations: Abdomen is soft. There is no mass.      Tenderness: There is no abdominal tenderness.   Skin:     Findings: No rash.   Neurological:      Comments: Neuro intact througout   Psychiatric:         Behavior: Behavior normal.         Thought Content: Thought content normal.         Judgment: Judgment normal.         Labs:   No results found for this or any previous visit (from the past 336 hour(s)).  CMP  No results found for: NA, K, CL, CO2, GLU, BUN, CREATININE, CALCIUM, PROT, ALBUMIN, BILITOT, ALKPHOS, AST, ALT, ANIONGAP, ESTGFRAFRICA, EGFRNONAA  No results found for: CEA  No results found for: PSA        Assessment/Plan:     Problem List Items Addressed This Visit     History of lung cancer     Patient seems to be doing ok at this time but is due for yearly scan.  Will order this now and discussed this today.  Will call with result and have her back with me yearly.           Relevant Orders    CT Chest Without Contrast    CBC Auto Differential    Comprehensive Metabolic Panel          Discussion:     Follow up in about 1 year (around 1/6/2023).      Electronically signed by Denis Ferrara           28-Jan-2021 17:32

## 2022-02-01 ENCOUNTER — HOSPITAL ENCOUNTER (OUTPATIENT)
Dept: RADIOLOGY | Facility: HOSPITAL | Age: 62
Discharge: HOME | End: 2022-02-01
Attending: INTERNAL MEDICINE
Payer: MEDICARE

## 2022-02-01 DIAGNOSIS — R91.8 ABNORMAL CT SCAN OF LUNG: ICD-10-CM

## 2022-02-01 DIAGNOSIS — Z85.118 HISTORY OF LUNG CANCER: ICD-10-CM

## 2022-02-01 DIAGNOSIS — C34.11 PRIMARY CANCER OF RIGHT UPPER LOBE OF LUNG: Primary | ICD-10-CM

## 2022-02-01 DIAGNOSIS — J18.9 PNEUMONIA DUE TO INFECTIOUS ORGANISM, UNSPECIFIED LATERALITY, UNSPECIFIED PART OF LUNG: ICD-10-CM

## 2022-02-01 PROCEDURE — 71250 CT THORAX DX C-: CPT | Mod: TC,PO

## 2022-02-01 RX ORDER — LEVOFLOXACIN 750 MG/1
750 TABLET ORAL DAILY
Qty: 10 TABLET | Refills: 0 | Status: SHIPPED | OUTPATIENT
Start: 2022-02-01 | End: 2022-02-11

## 2022-05-02 ENCOUNTER — HOSPITAL ENCOUNTER (OUTPATIENT)
Dept: RADIOLOGY | Facility: HOSPITAL | Age: 62
Discharge: HOME | End: 2022-05-02
Attending: NURSE PRACTITIONER
Payer: MEDICARE

## 2022-05-02 DIAGNOSIS — R91.8 ABNORMAL CT SCAN OF LUNG: ICD-10-CM

## 2022-05-02 DIAGNOSIS — C34.11 PRIMARY CANCER OF RIGHT UPPER LOBE OF LUNG: ICD-10-CM

## 2022-05-02 PROCEDURE — 71250 CT THORAX DX C-: CPT | Mod: TC,PO

## 2022-05-10 ENCOUNTER — PATIENT MESSAGE (OUTPATIENT)
Dept: HEMATOLOGY/ONCOLOGY | Facility: CLINIC | Age: 62
End: 2022-05-10

## 2022-05-10 DIAGNOSIS — R91.8 ABNORMAL CT SCAN OF LUNG: ICD-10-CM

## 2022-05-10 DIAGNOSIS — C34.11 PRIMARY CANCER OF RIGHT UPPER LOBE OF LUNG: Primary | ICD-10-CM

## 2022-06-08 ENCOUNTER — PATIENT MESSAGE (OUTPATIENT)
Dept: FAMILY MEDICINE | Facility: CLINIC | Age: 62
End: 2022-06-08

## 2022-06-08 ENCOUNTER — TELEPHONE (OUTPATIENT)
Dept: FAMILY MEDICINE | Facility: CLINIC | Age: 62
End: 2022-06-08

## 2022-06-08 DIAGNOSIS — Z79.899 ENCOUNTER FOR LONG-TERM (CURRENT) USE OF OTHER MEDICATIONS: Primary | ICD-10-CM

## 2022-06-08 DIAGNOSIS — I10 ESSENTIAL HYPERTENSION: ICD-10-CM

## 2022-06-08 DIAGNOSIS — Z00.00 WELLNESS EXAMINATION: ICD-10-CM

## 2022-06-15 ENCOUNTER — PATIENT MESSAGE (OUTPATIENT)
Dept: FAMILY MEDICINE | Facility: CLINIC | Age: 62
End: 2022-06-15

## 2022-06-15 ENCOUNTER — OFFICE VISIT (OUTPATIENT)
Dept: FAMILY MEDICINE | Facility: CLINIC | Age: 62
End: 2022-06-15
Payer: MEDICARE

## 2022-06-15 ENCOUNTER — TELEPHONE (OUTPATIENT)
Dept: FAMILY MEDICINE | Facility: CLINIC | Age: 62
End: 2022-06-15

## 2022-06-15 VITALS
HEIGHT: 60 IN | DIASTOLIC BLOOD PRESSURE: 80 MMHG | WEIGHT: 137 LBS | HEART RATE: 83 BPM | BODY MASS INDEX: 26.9 KG/M2 | SYSTOLIC BLOOD PRESSURE: 112 MMHG

## 2022-06-15 DIAGNOSIS — Z12.31 OTHER SCREENING MAMMOGRAM: ICD-10-CM

## 2022-06-15 DIAGNOSIS — I10 ESSENTIAL HYPERTENSION: ICD-10-CM

## 2022-06-15 DIAGNOSIS — Z85.118 HISTORY OF LUNG CANCER: ICD-10-CM

## 2022-06-15 DIAGNOSIS — Z00.00 WELLNESS EXAMINATION: Primary | ICD-10-CM

## 2022-06-15 DIAGNOSIS — F41.9 ANXIETY: ICD-10-CM

## 2022-06-15 DIAGNOSIS — E03.9 ACQUIRED HYPOTHYROIDISM: ICD-10-CM

## 2022-06-15 DIAGNOSIS — Z78.0 MENOPAUSE: ICD-10-CM

## 2022-06-15 PROCEDURE — 99396 PREV VISIT EST AGE 40-64: CPT | Mod: S$GLB,,, | Performed by: FAMILY MEDICINE

## 2022-06-15 PROCEDURE — 3008F BODY MASS INDEX DOCD: CPT | Mod: CPTII,S$GLB,, | Performed by: FAMILY MEDICINE

## 2022-06-15 PROCEDURE — 1159F PR MEDICATION LIST DOCUMENTED IN MEDICAL RECORD: ICD-10-PCS | Mod: CPTII,S$GLB,, | Performed by: FAMILY MEDICINE

## 2022-06-15 PROCEDURE — 3079F DIAST BP 80-89 MM HG: CPT | Mod: CPTII,S$GLB,, | Performed by: FAMILY MEDICINE

## 2022-06-15 PROCEDURE — 3074F SYST BP LT 130 MM HG: CPT | Mod: CPTII,S$GLB,, | Performed by: FAMILY MEDICINE

## 2022-06-15 PROCEDURE — 3079F PR MOST RECENT DIASTOLIC BLOOD PRESSURE 80-89 MM HG: ICD-10-PCS | Mod: CPTII,S$GLB,, | Performed by: FAMILY MEDICINE

## 2022-06-15 PROCEDURE — 99396 PR PREVENTIVE VISIT,EST,40-64: ICD-10-PCS | Mod: S$GLB,,, | Performed by: FAMILY MEDICINE

## 2022-06-15 PROCEDURE — 3008F PR BODY MASS INDEX (BMI) DOCUMENTED: ICD-10-PCS | Mod: CPTII,S$GLB,, | Performed by: FAMILY MEDICINE

## 2022-06-15 PROCEDURE — 1159F MED LIST DOCD IN RCRD: CPT | Mod: CPTII,S$GLB,, | Performed by: FAMILY MEDICINE

## 2022-06-15 PROCEDURE — 3074F PR MOST RECENT SYSTOLIC BLOOD PRESSURE < 130 MM HG: ICD-10-PCS | Mod: CPTII,S$GLB,, | Performed by: FAMILY MEDICINE

## 2022-06-15 RX ORDER — ESCITALOPRAM OXALATE 10 MG/1
10 TABLET ORAL DAILY
Qty: 30 TABLET | Refills: 2 | Status: SHIPPED | OUTPATIENT
Start: 2022-06-15 | End: 2022-09-15 | Stop reason: SDUPTHER

## 2022-06-15 RX ORDER — LOSARTAN POTASSIUM 25 MG/1
25 TABLET ORAL DAILY
Qty: 90 TABLET | Refills: 3 | Status: SHIPPED | OUTPATIENT
Start: 2022-06-15 | End: 2023-04-10 | Stop reason: SDUPTHER

## 2022-06-15 NOTE — PROGRESS NOTES
SUBJECTIVE:    Patient ID: Ember Delgado is a 61 y.o. female.    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  She notes increased stress in dealing with the customers.  She has not been going to her volleyball games for exercise.  She will walk around her [place] acre land for 20 minutes several days a week.    History of lung cancer and partial lobectomy.  Now followed by Dr. Ferrara who order CT scans of the chest.  She has some ground-glass opacities in the apexes that they are monitoring.    Mammogram due in 2022.    -colonoscopy with Dr. [facility] 5 years       No visits with results within 6 Month(s) from this visit.   Latest known visit with results is:   No results found for any previous visit.       Past Medical History:   Diagnosis Date    Essential hypertension, malignant     Hypothyroid     Lung cancer     Primary cancer of right upper lobe of lung 2017    Stage IIIA NSCLC with sarcomatoid features.  Received neoadjuvant Cisplstin/Etop/XRT Surgery 2010. Patient had been in remission since that time.      Social History     Socioeconomic History    Marital status: Significant Other   Tobacco Use    Smoking status: Former Smoker     Packs/day: 1.30     Types: Cigarettes     Quit date:      Years since quittin.4    Smokeless tobacco: Never Used   Substance and Sexual Activity    Alcohol use: No    Drug use: No     Past Surgical History:   Procedure Laterality Date    APPENDECTOMY      HYSTERECTOMY      Right upper lobectomy.  Right     TUBAL LIGATION Bilateral      No family history on file.    Review of patient's allergies indicates:  No Known Allergies    Current Outpatient Medications:     amino acids (AMINO ACID ORAL), Take by mouth., Disp: , Rfl:     levothyroxine (SYNTHROID) 100 MCG tablet, Take 100 mcg by mouth once daily., Disp: , Rfl:      magnesium oxide (MAG-OX) 400 mg (241.3 mg magnesium) tablet, Take 400 mg by mouth., Disp: , Rfl:     multivitamin (THERAGRAN) per tablet, Take 1 tablet by mouth., Disp: , Rfl:     omega-3/dha/epa/dpa/fish oil (OMEGA-3 2100 ORAL), Take by mouth., Disp: , Rfl:     UBIQUINOL, BULK, MISC, by Misc.(Non-Drug; Combo Route) route., Disp: , Rfl:     EScitalopram oxalate (LEXAPRO) 10 MG tablet, Take 1 tablet (10 mg total) by mouth once daily., Disp: 30 tablet, Rfl: 2    losartan (COZAAR) 25 MG tablet, Take 1 tablet (25 mg total) by mouth once daily., Disp: 90 tablet, Rfl: 3    Review of Systems   Constitutional: Negative for appetite change, chills, fatigue, fever and unexpected weight change.   HENT: Negative for congestion, ear pain, sinus pain, sore throat and trouble swallowing.    Eyes: Negative for pain, discharge and visual disturbance.   Respiratory: Negative for apnea, cough, shortness of breath and wheezing.    Cardiovascular: Negative for chest pain, palpitations and leg swelling.   Gastrointestinal: Negative for abdominal pain, blood in stool, constipation, diarrhea, nausea and vomiting.   Endocrine: Negative for heat intolerance, polydipsia and polyuria.   Genitourinary: Negative for difficulty urinating, dyspareunia, dysuria, frequency, hematuria and menstrual problem.   Musculoskeletal: Positive for arthralgias (hips  ache occas, ). Negative for back pain, gait problem, joint swelling and myalgias.   Allergic/Immunologic: Negative for environmental allergies, food allergies and immunocompromised state.   Neurological: Negative for dizziness, tremors, seizures, numbness and headaches.   Psychiatric/Behavioral: Positive for dysphoric mood. Negative for behavioral problems, confusion, hallucinations and suicidal ideas. The patient is nervous/anxious.           Objective:      Vitals:    06/15/22 0835   BP: 112/80   Pulse: 83   Weight: 62.1 kg (137 lb)   Height: 5' (1.524 m)     Physical Exam  Vitals and  nursing note reviewed.   Constitutional:       General: She is not in acute distress.     Appearance: Normal appearance. She is well-developed. She is not toxic-appearing.   HENT:      Head: Normocephalic and atraumatic.      Right Ear: Tympanic membrane and external ear normal.      Left Ear: Tympanic membrane and external ear normal.      Nose: Nose normal.      Mouth/Throat:      Pharynx: Oropharynx is clear.   Eyes:      Pupils: Pupils are equal, round, and reactive to light.   Neck:      Thyroid: No thyromegaly.      Vascular: No carotid bruit.   Cardiovascular:      Rate and Rhythm: Normal rate and regular rhythm.      Heart sounds: Normal heart sounds. No murmur heard.  Pulmonary:      Effort: Pulmonary effort is normal.      Breath sounds: Normal breath sounds. No wheezing or rales.   Abdominal:      General: Bowel sounds are normal. There is no distension.      Palpations: Abdomen is soft.      Tenderness: There is no abdominal tenderness.   Musculoskeletal:         General: No tenderness or deformity. Normal range of motion.      Cervical back: Normal range of motion and neck supple.      Lumbar back: Normal. No spasms.      Comments: Bends 90 degrees at  Waist,shoulders  And  Knees  Good  rom   Lymphadenopathy:      Cervical: No cervical adenopathy.   Skin:     General: Skin is warm and dry.      Findings: No rash.   Neurological:      Mental Status: She is alert and oriented to person, place, and time.      Cranial Nerves: No cranial nerve deficit.      Coordination: Coordination normal.   Psychiatric:         Behavior: Behavior normal.         Thought Content: Thought content normal.         Judgment: Judgment normal.      Comments: Somewhat depressed and somewhat anxious            Assessment:       1. Wellness examination    2. Other screening mammogram    3. Anxiety    4. Essential hypertension    5. History of lung cancer    6. Acquired hypothyroidism    7. Menopause         Plan:       Wellness  examination  Recommend resuming exercise  Other screening mammogram  -     Mammo Digital Screening Bilat; Future; Expected date: 06/15/2022  Schedule mammogram in August or later  Anxiety  -     EScitalopram oxalate (LEXAPRO) 10 MG tablet; Take 1 tablet (10 mg total) by mouth once daily.  Dispense: 30 tablet; Refill: 2  Trial of Lexapro 10 mg daily for stress and anxiety  Essential hypertension  -     losartan (COZAAR) 25 MG tablet; Take 1 tablet (25 mg total) by mouth once daily.  Dispense: 90 tablet; Refill: 3  Blood pressure well controlled  History of lung cancer  Following with Dr. Ferrara CT scans ordered  Acquired hypothyroidism  Continue levothyroxine  Menopause  Lab work was done with Dr. Ortega and we will obtain the results.    No follow-ups on file.        6/15/2022 Jared Leon

## 2022-06-16 RX ORDER — ROSUVASTATIN CALCIUM 5 MG/1
5 TABLET, COATED ORAL
Qty: 36 TABLET | Refills: 0 | Status: SHIPPED | OUTPATIENT
Start: 2022-06-17 | End: 2022-08-24 | Stop reason: SDUPTHER

## 2022-06-23 ENCOUNTER — TELEPHONE (OUTPATIENT)
Dept: FAMILY MEDICINE | Facility: CLINIC | Age: 62
End: 2022-06-23

## 2022-06-23 NOTE — TELEPHONE ENCOUNTER
----- Message from Jennifer Betancourt sent at 6/23/2022 10:47 AM CDT -----  Pt calling said said the Rosuvastatin Qty is 36 with no refills they are wondering if this is correct thought maybe It was an error and supposed to be qty 30 with 6 refills call the pt to clarify and send a new script  # [phone]

## 2022-06-23 NOTE — TELEPHONE ENCOUNTER
Spoke with pt and clarified per Dr. Leon it is to be taken Monday Wednesday and Friday. We only gave 36 tablets for a 90 day supply with no refills so we can check labs.

## 2022-08-09 ENCOUNTER — HOSPITAL ENCOUNTER (OUTPATIENT)
Dept: RADIOLOGY | Facility: HOSPITAL | Age: 62
Discharge: HOME | End: 2022-08-09
Attending: NURSE PRACTITIONER
Payer: MEDICARE

## 2022-08-09 ENCOUNTER — HOSPITAL ENCOUNTER (OUTPATIENT)
Dept: RADIOLOGY | Facility: HOSPITAL | Age: 62
Discharge: HOME | End: 2022-08-09
Attending: FAMILY MEDICINE
Payer: MEDICARE

## 2022-08-09 VITALS — WEIGHT: 136.88 LBS | BODY MASS INDEX: 26.87 KG/M2 | HEIGHT: 60 IN

## 2022-08-09 DIAGNOSIS — C34.11 PRIMARY CANCER OF RIGHT UPPER LOBE OF LUNG: ICD-10-CM

## 2022-08-09 DIAGNOSIS — Z12.31 OTHER SCREENING MAMMOGRAM: ICD-10-CM

## 2022-08-09 DIAGNOSIS — R91.8 ABNORMAL CT SCAN OF LUNG: ICD-10-CM

## 2022-08-09 PROCEDURE — 77063 BREAST TOMOSYNTHESIS BI: CPT | Mod: TC,PO

## 2022-08-09 PROCEDURE — 77067 SCR MAMMO BI INCL CAD: CPT | Mod: TC,PO

## 2022-08-09 PROCEDURE — 71250 CT THORAX DX C-: CPT | Mod: TC,PO

## 2022-08-15 ENCOUNTER — TELEPHONE (OUTPATIENT)
Dept: FAMILY MEDICINE | Facility: CLINIC | Age: 62
End: 2022-08-15

## 2022-08-15 NOTE — TELEPHONE ENCOUNTER
----- Message from Jared Leon MD sent at 8/14/2022  1:11 PM CDT -----  Call patient.  Mammogram was normal.  Repeat mammogram in 1 year.

## 2022-08-24 RX ORDER — ROSUVASTATIN CALCIUM 5 MG/1
5 TABLET, COATED ORAL
Qty: 36 TABLET | Refills: 0 | Status: SHIPPED | OUTPATIENT
Start: 2022-08-24 | End: 2022-11-04 | Stop reason: SDUPTHER

## 2022-08-24 NOTE — TELEPHONE ENCOUNTER
----- Message from Caro Finnegan sent at 8/24/2022  3:41 PM CDT -----  Refill for Rosuvastatin. [facility]. Pt states that Dr. Jonas's office will be faxing the lab results for you to review.  Pt #[phone]

## 2022-08-31 ENCOUNTER — TELEPHONE (OUTPATIENT)
Dept: HEMATOLOGY/ONCOLOGY | Facility: CLINIC | Age: 62
End: 2022-08-31

## 2022-08-31 NOTE — TELEPHONE ENCOUNTER
Left VM informing pt that CT results are stable and to follow up as scheduled. Call back with any questions.

## 2022-08-31 NOTE — TELEPHONE ENCOUNTER
----- Message from LISSA Ashraf sent at 8/31/2022  3:48 PM CDT -----  Please call and notify patient CT  results are. Continue follow up as scheduled.

## 2022-09-15 ENCOUNTER — TELEPHONE (OUTPATIENT)
Dept: FAMILY MEDICINE | Facility: CLINIC | Age: 62
End: 2022-09-15

## 2022-09-15 DIAGNOSIS — F41.9 ANXIETY: ICD-10-CM

## 2022-09-15 RX ORDER — ESCITALOPRAM OXALATE 10 MG/1
10 TABLET ORAL DAILY
Qty: 30 TABLET | Refills: 2 | Status: SHIPPED | OUTPATIENT
Start: 2022-09-15 | End: 2022-12-19 | Stop reason: SDUPTHER

## 2022-09-15 NOTE — TELEPHONE ENCOUNTER
----- Message from Cintia Dwyer MA sent at 9/15/2022 12:49 PM CDT -----  Pt is calling for a recall on her escitalopram. # [phone]

## 2022-09-15 NOTE — TELEPHONE ENCOUNTER
----- Message from Cintia Dwyer MA sent at 9/15/2022 12:49 PM CDT -----  Pt is calling for a recall on her escitalopram. # [phone]

## 2022-10-04 ENCOUNTER — HOSPITAL ENCOUNTER (OUTPATIENT)
Dept: RADIOLOGY | Facility: HOSPITAL | Age: 62
Discharge: HOME | End: 2022-10-04
Attending: FAMILY MEDICINE
Payer: MEDICARE

## 2022-10-04 ENCOUNTER — OFFICE VISIT (OUTPATIENT)
Dept: FAMILY MEDICINE | Facility: CLINIC | Age: 62
End: 2022-10-04
Payer: MEDICARE

## 2022-10-04 ENCOUNTER — TELEPHONE (OUTPATIENT)
Dept: FAMILY MEDICINE | Facility: CLINIC | Age: 62
End: 2022-10-04

## 2022-10-04 VITALS
SYSTOLIC BLOOD PRESSURE: 138 MMHG | WEIGHT: 146 LBS | BODY MASS INDEX: 28.66 KG/M2 | HEART RATE: 88 BPM | DIASTOLIC BLOOD PRESSURE: 86 MMHG | HEIGHT: 60 IN

## 2022-10-04 DIAGNOSIS — S43.61XA SPRAIN OF RIGHT STERNOCLAVICULAR JOINT, INITIAL ENCOUNTER: Primary | ICD-10-CM

## 2022-10-04 DIAGNOSIS — I10 ESSENTIAL HYPERTENSION: ICD-10-CM

## 2022-10-04 DIAGNOSIS — M25.511 ACUTE PAIN OF RIGHT SHOULDER: Primary | ICD-10-CM

## 2022-10-04 DIAGNOSIS — M25.511 ACUTE PAIN OF RIGHT SHOULDER: ICD-10-CM

## 2022-10-04 DIAGNOSIS — Z85.118 HISTORY OF LUNG CANCER: ICD-10-CM

## 2022-10-04 DIAGNOSIS — Z78.0 MENOPAUSE: ICD-10-CM

## 2022-10-04 DIAGNOSIS — E03.9 ACQUIRED HYPOTHYROIDISM: ICD-10-CM

## 2022-10-04 PROCEDURE — 1159F PR MEDICATION LIST DOCUMENTED IN MEDICAL RECORD: ICD-10-PCS | Mod: CPTII,S$GLB,, | Performed by: FAMILY MEDICINE

## 2022-10-04 PROCEDURE — 3008F PR BODY MASS INDEX (BMI) DOCUMENTED: ICD-10-PCS | Mod: CPTII,S$GLB,, | Performed by: FAMILY MEDICINE

## 2022-10-04 PROCEDURE — 3079F DIAST BP 80-89 MM HG: CPT | Mod: CPTII,S$GLB,, | Performed by: FAMILY MEDICINE

## 2022-10-04 PROCEDURE — 3075F SYST BP GE 130 - 139MM HG: CPT | Mod: CPTII,S$GLB,, | Performed by: FAMILY MEDICINE

## 2022-10-04 PROCEDURE — 99213 PR OFFICE/OUTPT VISIT, EST, LEVL III, 20-29 MIN: ICD-10-PCS | Mod: S$GLB,,, | Performed by: FAMILY MEDICINE

## 2022-10-04 PROCEDURE — 3008F BODY MASS INDEX DOCD: CPT | Mod: CPTII,S$GLB,, | Performed by: FAMILY MEDICINE

## 2022-10-04 PROCEDURE — 3079F PR MOST RECENT DIASTOLIC BLOOD PRESSURE 80-89 MM HG: ICD-10-PCS | Mod: CPTII,S$GLB,, | Performed by: FAMILY MEDICINE

## 2022-10-04 PROCEDURE — 3075F PR MOST RECENT SYSTOLIC BLOOD PRESS GE 130-139MM HG: ICD-10-PCS | Mod: CPTII,S$GLB,, | Performed by: FAMILY MEDICINE

## 2022-10-04 PROCEDURE — 73010 X-RAY EXAM OF SHOULDER BLADE: CPT | Mod: TC,PO,RT

## 2022-10-04 PROCEDURE — 99213 OFFICE O/P EST LOW 20 MIN: CPT | Mod: S$GLB,,, | Performed by: FAMILY MEDICINE

## 2022-10-04 PROCEDURE — 4010F ACE/ARB THERAPY RXD/TAKEN: CPT | Mod: CPTII,S$GLB,, | Performed by: FAMILY MEDICINE

## 2022-10-04 PROCEDURE — 1159F MED LIST DOCD IN RCRD: CPT | Mod: CPTII,S$GLB,, | Performed by: FAMILY MEDICINE

## 2022-10-04 PROCEDURE — 73030 X-RAY EXAM OF SHOULDER: CPT | Mod: TC,PO,RT

## 2022-10-04 PROCEDURE — 4010F PR ACE/ARB THEARPY RXD/TAKEN: ICD-10-PCS | Mod: CPTII,S$GLB,, | Performed by: FAMILY MEDICINE

## 2022-10-04 RX ORDER — CYCLOBENZAPRINE HCL 10 MG
10 TABLET ORAL NIGHTLY
Qty: 20 TABLET | Refills: 0 | Status: SHIPPED | OUTPATIENT
Start: 2022-10-04 | End: 2022-10-14
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
## 2022-10-14 ENCOUNTER — TELEPHONE (OUTPATIENT)
Dept: FAMILY MEDICINE | Facility: CLINIC | Age: 62
End: 2022-10-14

## 2022-10-14 DIAGNOSIS — S43.61XA SPRAIN OF RIGHT STERNOCLAVICULAR JOINT, INITIAL ENCOUNTER: Primary | ICD-10-CM

## 2022-10-14 RX ORDER — HYDROCODONE BITARTRATE AND ACETAMINOPHEN 5; 325 MG/1; MG/1
1 TABLET ORAL EVERY 8 HOURS PRN
Qty: 21 TABLET | Refills: 0 | Status: SHIPPED | OUTPATIENT
Start: 2022-10-14 | End: 2023-06-15
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
## 2022-10-14 NOTE — TELEPHONE ENCOUNTER
----- Message from Bere Collazo sent at 10/14/2022 11:07 AM CDT -----  Pt sleep medication has given her hives. She had some old medication hydrocodone that she has been taking and would like to know if she can have that.   [facility]   [phone]

## 2022-11-04 RX ORDER — ROSUVASTATIN CALCIUM 5 MG/1
5 TABLET, COATED ORAL
Qty: 36 TABLET | Refills: 0 | Status: SHIPPED | OUTPATIENT
Start: 2022-11-04 | End: 2022-12-19 | Stop reason: SDUPTHER

## 2022-12-19 DIAGNOSIS — F41.9 ANXIETY: ICD-10-CM

## 2022-12-19 RX ORDER — ROSUVASTATIN CALCIUM 5 MG/1
5 TABLET, COATED ORAL
Qty: 36 TABLET | Refills: 0 | Status: SHIPPED | OUTPATIENT
Start: 2022-12-19 | End: 2023-03-30 | Stop reason: SDUPTHER

## 2022-12-19 RX ORDER — ESCITALOPRAM OXALATE 10 MG/1
10 TABLET ORAL DAILY
Qty: 30 TABLET | Refills: 2 | Status: SHIPPED | OUTPATIENT
Start: 2022-12-19 | End: 2023-03-30 | Stop reason: SDUPTHER

## 2022-12-22 ENCOUNTER — TELEPHONE (OUTPATIENT)
Dept: HEMATOLOGY/ONCOLOGY | Facility: CLINIC | Age: 62
End: 2022-12-22

## 2022-12-22 NOTE — TELEPHONE ENCOUNTER
Called patient for upcoming appointment and laboratory work required 01/05/2023, patient will fax results.

## 2023-01-05 ENCOUNTER — OFFICE VISIT (OUTPATIENT)
Dept: HEMATOLOGY/ONCOLOGY | Facility: CLINIC | Age: 63
End: 2023-01-05
Payer: MEDICARE

## 2023-01-05 VITALS
WEIGHT: 146.69 LBS | SYSTOLIC BLOOD PRESSURE: 151 MMHG | DIASTOLIC BLOOD PRESSURE: 83 MMHG | HEART RATE: 84 BPM | BODY MASS INDEX: 28.65 KG/M2 | TEMPERATURE: 98 F

## 2023-01-05 DIAGNOSIS — Z85.118 HISTORY OF LUNG CANCER: ICD-10-CM

## 2023-01-05 PROCEDURE — 3079F PR MOST RECENT DIASTOLIC BLOOD PRESSURE 80-89 MM HG: ICD-10-PCS | Mod: CPTII,S$GLB,, | Performed by: INTERNAL MEDICINE

## 2023-01-05 PROCEDURE — 3008F BODY MASS INDEX DOCD: CPT | Mod: CPTII,S$GLB,, | Performed by: INTERNAL MEDICINE

## 2023-01-05 PROCEDURE — 1159F MED LIST DOCD IN RCRD: CPT | Mod: CPTII,S$GLB,, | Performed by: INTERNAL MEDICINE

## 2023-01-05 PROCEDURE — 3008F PR BODY MASS INDEX (BMI) DOCUMENTED: ICD-10-PCS | Mod: CPTII,S$GLB,, | Performed by: INTERNAL MEDICINE

## 2023-01-05 PROCEDURE — 3077F PR MOST RECENT SYSTOLIC BLOOD PRESSURE >= 140 MM HG: ICD-10-PCS | Mod: CPTII,S$GLB,, | Performed by: INTERNAL MEDICINE

## 2023-01-05 PROCEDURE — 3077F SYST BP >= 140 MM HG: CPT | Mod: CPTII,S$GLB,, | Performed by: INTERNAL MEDICINE

## 2023-01-05 PROCEDURE — 3079F DIAST BP 80-89 MM HG: CPT | Mod: CPTII,S$GLB,, | Performed by: INTERNAL MEDICINE

## 2023-01-05 PROCEDURE — 99213 PR OFFICE/OUTPT VISIT, EST, LEVL III, 20-29 MIN: ICD-10-PCS | Mod: S$GLB,,, | Performed by: INTERNAL MEDICINE

## 2023-01-05 PROCEDURE — 99213 OFFICE O/P EST LOW 20 MIN: CPT | Mod: S$GLB,,, | Performed by: INTERNAL MEDICINE

## 2023-01-05 PROCEDURE — 1159F PR MEDICATION LIST DOCUMENTED IN MEDICAL RECORD: ICD-10-PCS | Mod: CPTII,S$GLB,, | Performed by: INTERNAL MEDICINE

## 2023-01-05 NOTE — PROGRESS NOTES
PROGRESS NOTE    Subjective:       Patient ID: Ember Delgado is a 62 y.o. female.    Chief Complaint:  No chief complaint on file.  lung cancer history.   Primary cancer of right upper lobe of lung (Chronic)        Stage IIIA NSCLC with sarcomatoid features.   Received neoadjuvant Cisplstin/Etop/XRT  Surgery 2/2010. Patient had been in remission since that time.           History of Present Illness:   Ember Delgado is a 62 y.o. female who presents for yearly follow up.     Ms. Delgado has no new complaints at this time.  Feeling ok.    She does note that she has recurrent issues with back spasms.  This is not a new complaint however.     CT chest:  11/12/2020 Neg    Family and Social history reviewed and is unchanged from 2/27/2017.       ROS:  Review of Systems   Constitutional:  Negative for appetite change, fever and unexpected weight change.   HENT:  Negative for mouth sores.    Eyes:  Negative for visual disturbance.   Respiratory:  Negative for cough and shortness of breath.    Cardiovascular:  Negative for chest pain and leg swelling.   Gastrointestinal:  Negative for abdominal pain, blood in stool and diarrhea.   Genitourinary:  Negative for frequency and hematuria.   Musculoskeletal:  Negative for back pain.   Skin:  Negative for rash.   Neurological:  Negative for headaches.   Hematological:  Negative for adenopathy.   Psychiatric/Behavioral:  The patient is not nervous/anxious.         Current Outpatient Medications:     amino acids (AMINO ACID ORAL), Take by mouth., Disp: , Rfl:     EScitalopram oxalate (LEXAPRO) 10 MG tablet, Take 1 tablet (10 mg total) by mouth once daily., Disp: 30 tablet, Rfl: 2    HYDROcodone-acetaminophen (NORCO) 5-325 mg per tablet, Take 1 tablet by mouth every 8 (eight) hours as needed for Pain., Disp: 21 tablet, Rfl: 0    levothyroxine (SYNTHROID) 100 MCG tablet, Take 100 mcg by mouth once daily., Disp: ,  Rfl:     losartan (COZAAR) 25 MG tablet, Take 1 tablet (25 mg total) by mouth once daily., Disp: 90 tablet, Rfl: 3    magnesium oxide (MAG-OX) 400 mg (241.3 mg magnesium) tablet, Take 400 mg by mouth., Disp: , Rfl:     multivitamin (THERAGRAN) per tablet, Take 1 tablet by mouth., Disp: , Rfl:     omega-3/dha/epa/dpa/fish oil (OMEGA-3 2100 ORAL), Take by mouth., Disp: , Rfl:     rosuvastatin (CRESTOR) 5 MG tablet, Take 1 tablet (5 mg total) by mouth 3 (three) times a week., Disp: 36 tablet, Rfl: 0    UBIQUINOL, BULK, MISC, by Misc.(Non-Drug; Combo Route) route., Disp: , Rfl:         Objective:       Physical Examination:     BP (!) 151/83   Pulse 84   Temp 98.1 °F (36.7 °C)   Wt 66.5 kg (146 lb 11.2 oz)   BMI 28.65 kg/m²     Physical Exam  Constitutional:       Appearance: She is well-developed.   HENT:      Head: Normocephalic and atraumatic.      Right Ear: External ear normal.      Left Ear: External ear normal.   Eyes:      Conjunctiva/sclera: Conjunctivae normal.      Pupils: Pupils are equal, round, and reactive to light.   Neck:      Thyroid: No thyromegaly.      Trachea: No tracheal deviation.   Cardiovascular:      Rate and Rhythm: Normal rate and regular rhythm.      Heart sounds: Normal heart sounds.   Pulmonary:      Effort: Pulmonary effort is normal.      Breath sounds: Normal breath sounds.   Abdominal:      General: Bowel sounds are normal. There is no distension.      Palpations: Abdomen is soft. There is no mass.      Tenderness: There is no abdominal tenderness.   Skin:     Findings: No rash.   Neurological:      Comments: Neuro intact througout   Psychiatric:         Behavior: Behavior normal.         Thought Content: Thought content normal.         Judgment: Judgment normal.       Labs:   No results found for this or any previous visit (from the past 336 hour(s)).  CMP  No results found for: NA, K, CL, CO2, GLU, BUN, CREATININE, CALCIUM, PROT, ALBUMIN, BILITOT, ALKPHOS, AST, ALT, ANIONGAP,  ESTGFRAFRICA, EGFRNONAA  No results found for: CEA  No results found for: PSA        Assessment/Plan:     Problem List Items Addressed This Visit       History of lung cancer     Patient is doing well.  I discussed the YANN 4mm nodule which has been present since November 2020.  The Feb 2022 CT scan states no changes compared to 11/2020 scan.  No new symptoms at this time and will continue yearly follow up.  Next scan in August 2023.           Relevant Orders    CT Chest Without Contrast         Discussion:     Follow up in about 1 year (around 1/5/2024).      Electronically signed by Denis Ferrara

## 2023-01-05 NOTE — ASSESSMENT & PLAN NOTE
Patient is doing well.  I discussed the YANN 4mm nodule which has been present since November 2020.  The Feb 2022 CT scan states no changes compared to 11/2020 scan.  No new symptoms at this time and will continue yearly follow up.  Next scan in August 2023.

## 2023-03-30 DIAGNOSIS — F41.9 ANXIETY: ICD-10-CM

## 2023-03-30 RX ORDER — ESCITALOPRAM OXALATE 10 MG/1
10 TABLET ORAL DAILY
Qty: 30 TABLET | Refills: 2 | Status: SHIPPED | OUTPATIENT
Start: 2023-03-30 | End: 2023-06-15 | Stop reason: SDUPTHER

## 2023-03-30 RX ORDER — ROSUVASTATIN CALCIUM 5 MG/1
5 TABLET, COATED ORAL
Qty: 36 TABLET | Refills: 0 | Status: SHIPPED | OUTPATIENT
Start: 2023-03-31 | End: 2023-04-10 | Stop reason: SDUPTHER

## 2023-03-30 NOTE — TELEPHONE ENCOUNTER
Spoke to pt and she stated she needs a refill on Lexapro and Crestor.   Last office visit 10/4  Next office visit 6/15

## 2023-03-30 NOTE — TELEPHONE ENCOUNTER
----- Message from Saira Fishman sent at 3/30/2023  2:57 PM CDT -----  Patient called and stated that she need a refill of her anxiety  medicine,and her cholesterol medicine called into [facility] please give her a call at [phone] patient did not know the name of the medicine

## 2023-04-10 DIAGNOSIS — F41.9 ANXIETY: ICD-10-CM

## 2023-04-10 DIAGNOSIS — I10 ESSENTIAL HYPERTENSION: ICD-10-CM

## 2023-04-10 RX ORDER — ROSUVASTATIN CALCIUM 5 MG/1
5 TABLET, COATED ORAL
Qty: 36 TABLET | Refills: 1 | Status: SHIPPED | OUTPATIENT
Start: 2023-04-25 | End: 2023-06-15 | Stop reason: SDUPTHER

## 2023-04-10 RX ORDER — LOSARTAN POTASSIUM 25 MG/1
25 TABLET ORAL DAILY
Qty: 30 TABLET | Refills: 2 | Status: SHIPPED | OUTPATIENT
Start: 2023-04-10 | End: 2023-06-15 | Stop reason: SDUPTHER

## 2023-04-10 NOTE — TELEPHONE ENCOUNTER
----- Message from Kierra Acevedo MA sent at 4/10/2023  8:17 AM CDT -----  Regarding: refill  Patient needs refills on losartan, and all other meds. She does not have meds with her.  [facility]  [phone]  work  [phone] cell

## 2023-04-10 NOTE — TELEPHONE ENCOUNTER
Patient is going out of the country in [place] and need refills on losartan, crestor and lexapro. Pt notified lexapro has 2 refills on it already.    Last ov: 10/4/22   Next ov: 6/15/23  Last written per chart:  6/15/22 & 3/31/23

## 2023-06-12 ENCOUNTER — PATIENT MESSAGE (OUTPATIENT)
Dept: FAMILY MEDICINE | Facility: CLINIC | Age: 63
End: 2023-06-12

## 2023-06-12 ENCOUNTER — TELEPHONE (OUTPATIENT)
Dept: FAMILY MEDICINE | Facility: CLINIC | Age: 63
End: 2023-06-12

## 2023-06-12 DIAGNOSIS — E03.9 ACQUIRED HYPOTHYROIDISM: ICD-10-CM

## 2023-06-12 DIAGNOSIS — I10 ESSENTIAL HYPERTENSION: Primary | ICD-10-CM

## 2023-06-12 DIAGNOSIS — Z79.899 ENCOUNTER FOR LONG-TERM (CURRENT) USE OF OTHER MEDICATIONS: ICD-10-CM

## 2023-06-12 NOTE — TELEPHONE ENCOUNTER
I would call patient first, looks like she gets labs from another provider in media. Not enough time to have labs drawn before OV

## 2023-06-13 ENCOUNTER — PATIENT MESSAGE (OUTPATIENT)
Dept: FAMILY MEDICINE | Facility: CLINIC | Age: 63
End: 2023-06-13

## 2023-06-15 ENCOUNTER — PATIENT MESSAGE (OUTPATIENT)
Dept: FAMILY MEDICINE | Facility: CLINIC | Age: 63
End: 2023-06-15

## 2023-06-15 ENCOUNTER — OFFICE VISIT (OUTPATIENT)
Dept: FAMILY MEDICINE | Facility: CLINIC | Age: 63
End: 2023-06-15
Payer: MEDICARE

## 2023-06-15 VITALS
SYSTOLIC BLOOD PRESSURE: 110 MMHG | WEIGHT: 140 LBS | DIASTOLIC BLOOD PRESSURE: 70 MMHG | HEART RATE: 76 BPM | HEIGHT: 60 IN | BODY MASS INDEX: 27.48 KG/M2

## 2023-06-15 DIAGNOSIS — Z85.118 HISTORY OF LUNG CANCER: ICD-10-CM

## 2023-06-15 DIAGNOSIS — E78.2 MIXED HYPERLIPIDEMIA: ICD-10-CM

## 2023-06-15 DIAGNOSIS — Z23 NEED FOR PNEUMOCOCCAL 20-VALENT CONJUGATE VACCINATION: ICD-10-CM

## 2023-06-15 DIAGNOSIS — F41.9 ANXIETY: ICD-10-CM

## 2023-06-15 DIAGNOSIS — I10 ESSENTIAL HYPERTENSION: Primary | ICD-10-CM

## 2023-06-15 DIAGNOSIS — Z12.31 SCREENING MAMMOGRAM FOR HIGH-RISK PATIENT: ICD-10-CM

## 2023-06-15 DIAGNOSIS — Z87.891 FORMER SMOKER: ICD-10-CM

## 2023-06-15 DIAGNOSIS — R73.9 HYPERGLYCEMIA: ICD-10-CM

## 2023-06-15 PROCEDURE — 3078F PR MOST RECENT DIASTOLIC BLOOD PRESSURE < 80 MM HG: ICD-10-PCS | Mod: CPTII,S$GLB,, | Performed by: FAMILY MEDICINE

## 2023-06-15 PROCEDURE — 1159F PR MEDICATION LIST DOCUMENTED IN MEDICAL RECORD: ICD-10-PCS | Mod: CPTII,S$GLB,, | Performed by: FAMILY MEDICINE

## 2023-06-15 PROCEDURE — 1160F RVW MEDS BY RX/DR IN RCRD: CPT | Mod: CPTII,S$GLB,, | Performed by: FAMILY MEDICINE

## 2023-06-15 PROCEDURE — 99396 PR PREVENTIVE VISIT,EST,40-64: ICD-10-PCS | Mod: S$GLB,,, | Performed by: FAMILY MEDICINE

## 2023-06-15 PROCEDURE — 1159F MED LIST DOCD IN RCRD: CPT | Mod: CPTII,S$GLB,, | Performed by: FAMILY MEDICINE

## 2023-06-15 PROCEDURE — 3074F SYST BP LT 130 MM HG: CPT | Mod: CPTII,S$GLB,, | Performed by: FAMILY MEDICINE

## 2023-06-15 PROCEDURE — 99396 PREV VISIT EST AGE 40-64: CPT | Mod: S$GLB,,, | Performed by: FAMILY MEDICINE

## 2023-06-15 PROCEDURE — 3074F PR MOST RECENT SYSTOLIC BLOOD PRESSURE < 130 MM HG: ICD-10-PCS | Mod: CPTII,S$GLB,, | Performed by: FAMILY MEDICINE

## 2023-06-15 PROCEDURE — 3078F DIAST BP <80 MM HG: CPT | Mod: CPTII,S$GLB,, | Performed by: FAMILY MEDICINE

## 2023-06-15 PROCEDURE — 1160F PR REVIEW ALL MEDS BY PRESCRIBER/CLIN PHARMACIST DOCUMENTED: ICD-10-PCS | Mod: CPTII,S$GLB,, | Performed by: FAMILY MEDICINE

## 2023-06-15 PROCEDURE — 4010F ACE/ARB THERAPY RXD/TAKEN: CPT | Mod: CPTII,S$GLB,, | Performed by: FAMILY MEDICINE

## 2023-06-15 PROCEDURE — 3008F PR BODY MASS INDEX (BMI) DOCUMENTED: ICD-10-PCS | Mod: CPTII,S$GLB,, | Performed by: FAMILY MEDICINE

## 2023-06-15 PROCEDURE — 4010F PR ACE/ARB THEARPY RXD/TAKEN: ICD-10-PCS | Mod: CPTII,S$GLB,, | Performed by: FAMILY MEDICINE

## 2023-06-15 PROCEDURE — 3008F BODY MASS INDEX DOCD: CPT | Mod: CPTII,S$GLB,, | Performed by: FAMILY MEDICINE

## 2023-06-15 RX ORDER — ESCITALOPRAM OXALATE 20 MG/1
20 TABLET ORAL DAILY
Qty: 90 TABLET | Refills: 3 | Status: SHIPPED | OUTPATIENT
Start: 2023-06-15 | End: 2023-06-19 | Stop reason: SDUPTHER

## 2023-06-15 RX ORDER — ROSUVASTATIN CALCIUM 5 MG/1
5 TABLET, COATED ORAL
Qty: 90 TABLET | Refills: 3 | Status: SHIPPED | OUTPATIENT
Start: 2023-06-16 | End: 2025-10-03

## 2023-06-15 RX ORDER — ESCITALOPRAM OXALATE 10 MG/1
10 TABLET ORAL DAILY
Qty: 90 TABLET | Refills: 3 | Status: SHIPPED | OUTPATIENT
Start: 2023-06-15 | End: 2023-06-15

## 2023-06-15 RX ORDER — ROSUVASTATIN CALCIUM 5 MG/1
5 TABLET, COATED ORAL
Qty: 90 TABLET | Refills: 3 | Status: SHIPPED | OUTPATIENT
Start: 2023-06-16 | End: 2023-06-15

## 2023-06-15 RX ORDER — LOSARTAN POTASSIUM 25 MG/1
25 TABLET ORAL DAILY
Qty: 90 TABLET | Refills: 3 | Status: SHIPPED | OUTPATIENT
Start: 2023-06-15 | End: 2023-06-15

## 2023-06-15 RX ORDER — LOSARTAN POTASSIUM 25 MG/1
25 TABLET ORAL DAILY
Qty: 90 TABLET | Refills: 3 | Status: SHIPPED | OUTPATIENT
Start: 2023-06-15 | End: 2024-06-09

## 2023-06-15 NOTE — PROGRESS NOTES
"  SUBJECTIVE:   HPI: Ember Delgado  is a 62 y.o. female who presents for annual physical.    Patient is a 62-year-old female here for an annual exam.  She has no complaints today.  She states she is feeling very well.  Recently returned from a vacation in [place] in [place].  Does report that she picked up a "cold." Reports she had a mild cough that lasted a little more than a week.  She has recovered from this completely.    Patient sees Endocrinology , had labs back in March.  This did show hemoglobin A1c of 6.0.  Endocrinologist discussed plan with patient for "prediabetes." Patient opted to forego medication in lieu of diet and exercise.  Patient reports she has lost a total of 10 lb.  She is following the "sugar busters "diet and walking at least a mile daily.  Labs drawn this morning.    Patient has no concerns regarding her bowel health.  States that she has some occasional stress incontinence but this does not concern her.  States she sleeps well.  On occasion that she does have any trouble sleeping she just utilizes over-the-counter Unisom which works well for her.    Due for mammogram in August, due for colonoscopy in 2026.    Patient has a history of lung cancer, quit smoking in 2010 with diagnosis.  Patient does routinely see Oncology with .  She is an upcoming appointment with him and to have routine CT scan performed for surveillance.    Discussed vaccinations with patient.  Patient states she can not remember the last time she had a tetanus shot.  Does not wish to have a tetanus shot today.  Did discuss with patient incidents that would require her to receive a tetanus shot for her health and safety.  Patient verbalizes understanding.  Patient will discuss Shingrix vaccination with Dr. Ferrara, along with pneumococcal vaccination.      No visits with results within 6 Month(s) from this visit.   Latest known visit with results is:   No results found for any previous " visit.         Current Outpatient Medications on File Prior to Visit   Medication Sig Dispense Refill    amino acids (AMINO ACID ORAL) Take by mouth.      levothyroxine (SYNTHROID) 100 MCG tablet Take 100 mcg by mouth once daily.      magnesium oxide (MAG-OX) 400 mg (241.3 mg magnesium) tablet Take 400 mg by mouth.      multivitamin (THERAGRAN) per tablet Take 1 tablet by mouth.      omega-3/dha/epa/dpa/fish oil (OMEGA-3 2100 ORAL) Take by mouth.      UBIQUINOL, BULK, MISC by Misc.(Non-Drug; Combo Route) route.      [DISCONTINUED] EScitalopram oxalate (LEXAPRO) 10 MG tablet Take 1 tablet (10 mg total) by mouth once daily. 30 tablet 2    [DISCONTINUED] HYDROcodone-acetaminophen (NORCO) 5-325 mg per tablet Take 1 tablet by mouth every 8 (eight) hours as needed for Pain. 21 tablet 0    [DISCONTINUED] losartan (COZAAR) 25 MG tablet Take 1 tablet (25 mg total) by mouth once daily. 30 tablet 2    [DISCONTINUED] rosuvastatin (CRESTOR) 5 MG tablet Take 1 tablet (5 mg total) by mouth 3 (three) times a week. 36 tablet 1     No current facility-administered medications on file prior to visit.     Past Medical History:   Diagnosis Date    Essential hypertension, malignant     Hypothyroid     Lung cancer     Primary cancer of right upper lobe of lung 6/27/2017    Stage IIIA NSCLC with sarcomatoid features.  Received neoadjuvant Cisplstin/Etop/XRT Surgery 2/2010. Patient had been in remission since that time.      @[email or web address]  Past Surgical History:   Procedure Laterality Date    APPENDECTOMY      HYSTERECTOMY      Right upper lobectomy.  Right     TUBAL LIGATION Bilateral      History reviewed. No pertinent family history.    Marital Status: Significant Other  Alcohol History:  reports no history of alcohol use.  Tobacco History:  reports that she quit smoking about 13 years ago. Her smoking use included cigarettes. She smoked an average of 1.3 packs per day. She has never used smokeless tobacco.  Drug History:  reports no  history of drug use.    Health Maintenance Topics with due status: Not Due       Topic Last Completion Date    Influenza Vaccine 12/19/2017    Lipid Panel 11/30/2018    Colorectal Cancer Screening 08/04/2021     Immunization History   Administered Date(s) Administered    COVID-19, MRNA, LN-S, PF (MODERNA FULL 0.5 ML DOSE) 03/03/2021, 03/31/2021, 11/29/2021    Influenza - Quadrivalent - PF *Preferred* (6 months and older) 12/19/2017       Review of patient's allergies indicates:   Allergen Reactions    Flexeril [cyclobenzaprine] Hives       Current Outpatient Medications:     amino acids (AMINO ACID ORAL), Take by mouth., Disp: , Rfl:     EScitalopram oxalate (LEXAPRO) 20 MG tablet, Take 1 tablet (20 mg total) by mouth once daily., Disp: 90 tablet, Rfl: 3    levothyroxine (SYNTHROID) 100 MCG tablet, Take 100 mcg by mouth once daily., Disp: , Rfl:     losartan (COZAAR) 25 MG tablet, Take 1 tablet (25 mg total) by mouth once daily., Disp: 90 tablet, Rfl: 3    magnesium oxide (MAG-OX) 400 mg (241.3 mg magnesium) tablet, Take 400 mg by mouth., Disp: , Rfl:     multivitamin (THERAGRAN) per tablet, Take 1 tablet by mouth., Disp: , Rfl:     omega-3/dha/epa/dpa/fish oil (OMEGA-3 2100 ORAL), Take by mouth., Disp: , Rfl:     [START ON 6/16/2023] rosuvastatin (CRESTOR) 5 MG tablet, Take 1 tablet (5 mg total) by mouth 3 (three) times a week., Disp: 90 tablet, Rfl: 3    UBIQUINOL, BULK, MISC, by Misc.(Non-Drug; Combo Route) route., Disp: , Rfl:     Review of Systems   Constitutional:  Positive for activity change. Negative for appetite change, chills, fatigue, fever and unexpected weight change.        Increased activity   HENT:  Negative for nasal congestion, dental problem, ear pain, sore throat, tinnitus and trouble swallowing.    Eyes:  Negative for pain, discharge, visual disturbance and eye dryness.   Respiratory:  Negative for apnea, cough, shortness of breath and wheezing.    Cardiovascular:  Negative for chest pain,  palpitations and leg swelling.   Gastrointestinal:  Negative for abdominal pain, blood in stool, constipation, diarrhea, nausea, vomiting and reflux.   Genitourinary:  Positive for bladder incontinence. Negative for dysuria, frequency, hematuria, nocturia and urgency.        Occasional mild stress incontinence, patient states she wears a panty liner.   Musculoskeletal:  Negative for arthralgias, back pain, gait problem, myalgias and neck pain.   Integumentary:  Negative for rash and mole/lesion.   Neurological:  Negative for dizziness, tremors, weakness, numbness and headaches.   Hematological:  Does not bruise/bleed easily.   Psychiatric/Behavioral:  Negative for behavioral problems and hallucinations. The patient is not nervous/anxious.      OBJECTIVE:      Vitals:    06/15/23 0823   BP: 110/70   Pulse: 76   Weight: 63.5 kg (140 lb)   Height: 5' (1.524 m)     Physical Exam  Vitals and nursing note reviewed.   Constitutional:       General: She is not in acute distress.     Appearance: Normal appearance. She is well-developed and normal weight.   HENT:      Head: Normocephalic and atraumatic.      Right Ear: External ear normal.      Left Ear: External ear normal.      Nose: Nose normal. No congestion or rhinorrhea.      Mouth/Throat:      Mouth: Mucous membranes are moist.      Pharynx: Oropharynx is clear. No oropharyngeal exudate or posterior oropharyngeal erythema.   Eyes:      General:         Right eye: No discharge.         Left eye: No discharge.      Conjunctiva/sclera: Conjunctivae normal.      Pupils: Pupils are equal, round, and reactive to light.   Neck:      Thyroid: No thyroid mass or thyromegaly.      Vascular: No carotid bruit.   Cardiovascular:      Rate and Rhythm: Normal rate and regular rhythm.      Pulses: Normal pulses.           Radial pulses are 2+ on the right side and 2+ on the left side.      Heart sounds: Normal heart sounds. No murmur heard.  Pulmonary:      Effort: Pulmonary effort is  normal.      Breath sounds: Normal breath sounds and air entry. No wheezing or rales.   Abdominal:      General: Abdomen is flat. Bowel sounds are normal. There is no distension.      Palpations: Abdomen is soft.      Tenderness: There is no abdominal tenderness.   Musculoskeletal:         General: No tenderness or deformity. Normal range of motion.      Cervical back: Full passive range of motion without pain, normal range of motion and neck supple.      Lumbar back: Normal. No spasms.      Right lower leg: No edema.      Left lower leg: No edema.      Comments: Bends 90 degrees at  waist   Lymphadenopathy:      Cervical: No cervical adenopathy.   Skin:     General: Skin is warm and dry.      Capillary Refill: Capillary refill takes less than 2 seconds.      Coloration: Skin is not jaundiced.      Findings: No rash.   Neurological:      General: No focal deficit present.      Mental Status: She is alert and oriented to person, place, and time.      Cranial Nerves: No cranial nerve deficit.      Sensory: Sensation is intact.      Motor: Motor function is intact.      Coordination: Coordination is intact. Coordination normal.      Gait: Gait is intact.   Psychiatric:         Mood and Affect: Mood normal.         Speech: Speech normal.         Behavior: Behavior normal. Behavior is cooperative.         Thought Content: Thought content normal.         Judgment: Judgment normal.      Assessment:       1. Essential hypertension    2. Screening mammogram for high-risk patient    3. Anxiety    4. Mixed hyperlipidemia    5. Hyperglycemia        Plan:       Essential hypertension  Patient asks if she will possibly be able to come off of her blood pressure medication now that she is exercising and losing weight.  Told patient this could be a possibility, and instructed patient to begin checking her blood pressure in the morning before taking her medication and logging these results.  Report these findings and can further  discuss possible medication changes.  -     losartan (COZAAR) 25 MG tablet; Take 1 tablet (25 mg total) by mouth once daily.  Dispense: 90 tablet; Refill: 3    Screening mammogram for high-risk patient  -     Mammo Digital Screening Bilat; Future; Expected date: 08/10/2023    Anxiety  Patient requests increase of escitalopram.  We will change from 10 mg p.o. daily to 20 mg p.o. daily.  -     EScitalopram oxalate (LEXAPRO) 20 MG tablet; Take 1 tablet (20 mg total) by mouth once daily.  Dispense: 90 tablet; Refill: 3    Mixed hyperlipidemia  Continue Crestor 5 mg.  -     rosuvastatin (CRESTOR) 5 MG tablet; Take 1 tablet (5 mg total) by mouth 3 (three) times a week.  Dispense: 90 tablet; Refill: 3    Hyperglycemia  Patient has reduced carbs in diet.  Has been exercising daily.  We will recheck hemoglobin A1c for surveillance.-     Hemoglobin A1C; Future; Expected date: 06/15/2023        Counseled on age and gender appropriate medical preventative services, including cancer screenings, immunizations, overall nutritional health, need for a consistent exercise regimen and an overall push towards maintaining a vigorous and active lifestyle.      Follow up in about 1 year (around 6/15/2024) for ANNUAL.          6/15/2023 Bere Kumari M.D.

## 2023-06-16 LAB
ALBUMIN SERPL-MCNC: 4.5 G/DL (ref 3.6–5.1)
ALBUMIN/CREAT UR: 2 MCG/MG CREAT
ALBUMIN/GLOB SERPL: 2 (CALC) (ref 1–2.5)
ALP SERPL-CCNC: 69 U/L (ref 37–153)
ALT SERPL-CCNC: 14 U/L (ref 6–29)
APPEARANCE UR: CLEAR
AST SERPL-CCNC: 14 U/L (ref 10–35)
BACTERIA #/AREA URNS HPF: NORMAL /HPF
BACTERIA UR CULT: NORMAL
BASOPHILS # BLD AUTO: 38 CELLS/UL (ref 0–200)
BASOPHILS NFR BLD AUTO: 0.6 %
BILIRUB SERPL-MCNC: 0.5 MG/DL (ref 0.2–1.2)
BILIRUB UR QL STRIP: NEGATIVE
BUN SERPL-MCNC: 23 MG/DL (ref 7–25)
BUN/CREAT SERPL: NORMAL (CALC) (ref 6–22)
CALCIUM SERPL-MCNC: 9.2 MG/DL (ref 8.6–10.4)
CHLORIDE SERPL-SCNC: 103 MMOL/L (ref 98–110)
CHOLEST SERPL-MCNC: 195 MG/DL
CHOLEST/HDLC SERPL: 3.4 (CALC)
CO2 SERPL-SCNC: 29 MMOL/L (ref 20–32)
COLOR UR: YELLOW
CREAT SERPL-MCNC: 0.71 MG/DL (ref 0.5–1.05)
CREAT UR-MCNC: 86 MG/DL (ref 20–275)
EGFR: 96 ML/MIN/1.73M2
EOSINOPHIL # BLD AUTO: 290 CELLS/UL (ref 15–500)
EOSINOPHIL NFR BLD AUTO: 4.6 %
ERYTHROCYTE [DISTWIDTH] IN BLOOD BY AUTOMATED COUNT: 13.8 % (ref 11–15)
GLOBULIN SER CALC-MCNC: 2.3 G/DL (CALC) (ref 1.9–3.7)
GLUCOSE SERPL-MCNC: 91 MG/DL (ref 65–99)
GLUCOSE UR QL STRIP: NEGATIVE
HCT VFR BLD AUTO: 42.4 % (ref 35–45)
HDLC SERPL-MCNC: 58 MG/DL
HGB BLD-MCNC: 14 G/DL (ref 11.7–15.5)
HGB UR QL STRIP: NEGATIVE
HYALINE CASTS #/AREA URNS LPF: NORMAL /LPF
KETONES UR QL STRIP: NEGATIVE
LDLC SERPL CALC-MCNC: 119 MG/DL (CALC)
LEUKOCYTE ESTERASE UR QL STRIP: NEGATIVE
LYMPHOCYTES # BLD AUTO: 2048 CELLS/UL (ref 850–3900)
LYMPHOCYTES NFR BLD AUTO: 32.5 %
MCH RBC QN AUTO: 28.5 PG (ref 27–33)
MCHC RBC AUTO-ENTMCNC: 33 G/DL (ref 32–36)
MCV RBC AUTO: 86.2 FL (ref 80–100)
MICROALBUMIN UR-MCNC: 0.2 MG/DL
MONOCYTES # BLD AUTO: 504 CELLS/UL (ref 200–950)
MONOCYTES NFR BLD AUTO: 8 %
NEUTROPHILS # BLD AUTO: 3421 CELLS/UL (ref 1500–7800)
NEUTROPHILS NFR BLD AUTO: 54.3 %
NITRITE UR QL STRIP: NEGATIVE
NONHDLC SERPL-MCNC: 137 MG/DL (CALC)
PH UR STRIP: 6 [PH] (ref 5–8)
PLATELET # BLD AUTO: 258 THOUSAND/UL (ref 140–400)
PMV BLD REES-ECKER: 9.9 FL (ref 7.5–12.5)
POTASSIUM SERPL-SCNC: 5.2 MMOL/L (ref 3.5–5.3)
PROT SERPL-MCNC: 6.8 G/DL (ref 6.1–8.1)
PROT UR QL STRIP: NEGATIVE
RBC # BLD AUTO: 4.92 MILLION/UL (ref 3.8–5.1)
RBC #/AREA URNS HPF: NORMAL /HPF
SERVICE CMNT-IMP: NORMAL
SODIUM SERPL-SCNC: 140 MMOL/L (ref 135–146)
SP GR UR STRIP: 1.02 (ref 1–1.03)
SQUAMOUS #/AREA URNS HPF: NORMAL /HPF
TRIGL SERPL-MCNC: 81 MG/DL
TSH SERPL-ACNC: 0.69 MIU/L (ref 0.4–4.5)
WBC # BLD AUTO: 6.3 THOUSAND/UL (ref 3.8–10.8)
WBC #/AREA URNS HPF: NORMAL /HPF

## 2023-06-16 NOTE — PROGRESS NOTES
Call patient.  All her labs look very normal.  Cholesterol excellent at 195 triglycerides 81.  Sugar kidneys and liver looked normal.  CBC shows no anemia.  Continue current medications

## 2023-06-19 ENCOUNTER — TELEPHONE (OUTPATIENT)
Dept: FAMILY MEDICINE | Facility: CLINIC | Age: 63
End: 2023-06-19

## 2023-06-19 DIAGNOSIS — F41.9 ANXIETY: ICD-10-CM

## 2023-06-19 RX ORDER — ESCITALOPRAM OXALATE 20 MG/1
20 TABLET ORAL DAILY
Qty: 90 TABLET | Refills: 3 | Status: SHIPPED | OUTPATIENT
Start: 2023-06-19 | End: 2024-06-18

## 2023-06-19 NOTE — TELEPHONE ENCOUNTER
Bere Lopez, RT  Caller: Unspecified (Today,  1:08 PM)  Vm- 12:30-pt is calling back   [phone]

## 2023-06-19 NOTE — TELEPHONE ENCOUNTER
Per OV note. Lexapro was increased from 10mg to 20 mg daily. Rx was sent in on 6/15/23.     Pt notified and voiced understanding. States she is not home right now to look at the bottle. She will check and call the pharmacy if there is an issue.

## 2023-06-19 NOTE — TELEPHONE ENCOUNTER
----- Message from [facility], RT sent at 6/19/2023 12:13 PM CDT -----  Said that we were supposed to increase anxiety medication at visit, doesn't know name, but when she got refills said it doesn't look like anything was increased when she got them from pharmacy. [phone]

## 2023-06-19 NOTE — TELEPHONE ENCOUNTER
Patient said that she called the pharmacy and the pharmacist told her there wasn't a new dose for Lexapro wasn't sent in. I advised her that on 6/15 20mg Lexapro was sent in to [facility], verfied this is correct pharmacy. Wants to know if we can send it in again.

## 2023-06-19 NOTE — TELEPHONE ENCOUNTER
Spoke to patient with results verbatim per Dr Leon. Verbalized understanding on all. Patient asked about A1C. Advised her we can add this, per Clarence, to CBC. Called [facility] to add. Spoke to Uzma. Said that we can't add because the CBC, which is the specimen this could have been added to, is only kept for 3 days. Left message for patient to call me back so that I can let her know she will need to come back to have that done.

## 2023-06-19 NOTE — TELEPHONE ENCOUNTER
Advised patient that A1C could not be added to blood draw as specimen was only held for the days. She will come for A1C. Said she will do this soon. Remind me created. Aware she does not need to fast.

## 2023-06-19 NOTE — TELEPHONE ENCOUNTER
Bere Lopez, RT  Caller: Unspecified (Today,  1:08 PM)  Vm- 12:30-pt is calling back   [phone]

## 2023-06-19 NOTE — TELEPHONE ENCOUNTER
----- Message from Jared Leon MD sent at 6/16/2023  3:38 PM CDT -----  Call patient.  All her labs look very normal.  Cholesterol excellent at 195 triglycerides 81.  Sugar kidneys and liver looked normal.  CBC shows no anemia.  Continue current medications

## 2023-06-23 ENCOUNTER — TELEPHONE (OUTPATIENT)
Dept: FAMILY MEDICINE | Facility: CLINIC | Age: 63
End: 2023-06-23

## 2023-06-23 NOTE — TELEPHONE ENCOUNTER
----- Message from Bere Collazo sent at 6/23/2023  9:51 AM CDT -----  Vm- 8:55-pt would like her blood work results faxed to her thyroid doctor   [phone]

## 2023-06-29 ENCOUNTER — TELEPHONE (OUTPATIENT)
Dept: FAMILY MEDICINE | Facility: CLINIC | Age: 63
End: 2023-06-29

## 2023-06-29 NOTE — TELEPHONE ENCOUNTER
Spoke to patient that A1C is due. Said that Dr Jonas recently did labs on her including A1C. Pulled from [facility] and scanned into media. FYI to Bere because the patient said she wanted to know this result. It was 5.5

## 2023-06-29 NOTE — TELEPHONE ENCOUNTER
----- Message from [facility], RT sent at 6/19/2023  2:18 PM CDT -----  Regarding: A1C done?  6/19/2023 Advised patient that A1C could not be added to blood draw as specimen was only held for the days. She will come for A1C. Said she will do this soon. Remind me created. Aware she does not need to fast.

## 2023-07-05 ENCOUNTER — HOSPITAL ENCOUNTER (OUTPATIENT)
Dept: RADIOLOGY | Facility: HOSPITAL | Age: 63
Discharge: HOME | End: 2023-07-05
Attending: INTERNAL MEDICINE
Payer: MEDICARE

## 2023-07-05 DIAGNOSIS — Z85.118 HISTORY OF LUNG CANCER: ICD-10-CM

## 2023-07-05 PROCEDURE — 71250 CT THORAX DX C-: CPT | Mod: TC,PO

## 2023-07-07 ENCOUNTER — TELEPHONE (OUTPATIENT)
Dept: HEMATOLOGY/ONCOLOGY | Facility: CLINIC | Age: 63
End: 2023-07-07

## 2023-07-07 DIAGNOSIS — T14.8XXA FRACTURE OF BONE: Primary | ICD-10-CM

## 2023-07-07 NOTE — TELEPHONE ENCOUNTER
----- Message from Denis Varela MD sent at 7/7/2023 11:50 AM CDT -----  I saw you called her.  I would like her to see ortho to get their recommendations.  Would like their opinion on whether this looks like a new or old fracture based on the scan and her history.

## 2023-07-07 NOTE — TELEPHONE ENCOUNTER
Spoke to pt and made her aware of plans to refer her to ortho per Dr. Varela. Referral has been placed to Dr. Nuñez. Pt verbalized understanding.

## 2023-07-07 NOTE — TELEPHONE ENCOUNTER
----- Message from Denis Varela MD sent at 7/7/2023  5:11 AM CDT -----  Call patient , she has a right collar bone fracture.  Did she fall?   Any trauma? Let me know
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
## 2023-07-27 ENCOUNTER — OFFICE VISIT (OUTPATIENT)
Dept: ORTHOPEDICS | Facility: CLINIC | Age: 63
End: 2023-07-27
Payer: MEDICARE

## 2023-07-27 VITALS — HEIGHT: 60 IN | WEIGHT: 140 LBS | BODY MASS INDEX: 27.48 KG/M2

## 2023-07-27 DIAGNOSIS — S42.001A CLOSED NONDISPLACED FRACTURE OF RIGHT CLAVICLE, UNSPECIFIED PART OF CLAVICLE, INITIAL ENCOUNTER: Primary | ICD-10-CM

## 2023-07-27 PROCEDURE — 1160F RVW MEDS BY RX/DR IN RCRD: CPT | Mod: CPTII,S$GLB,, | Performed by: ORTHOPAEDIC SURGERY

## 2023-07-27 PROCEDURE — 1160F PR REVIEW ALL MEDS BY PRESCRIBER/CLIN PHARMACIST DOCUMENTED: ICD-10-PCS | Mod: CPTII,S$GLB,, | Performed by: ORTHOPAEDIC SURGERY

## 2023-07-27 PROCEDURE — 99203 OFFICE O/P NEW LOW 30 MIN: CPT | Mod: S$GLB,,, | Performed by: ORTHOPAEDIC SURGERY

## 2023-07-27 PROCEDURE — 3066F PR DOCUMENTATION OF TREATMENT FOR NEPHROPATHY: ICD-10-PCS | Mod: CPTII,S$GLB,, | Performed by: ORTHOPAEDIC SURGERY

## 2023-07-27 PROCEDURE — 3066F NEPHROPATHY DOC TX: CPT | Mod: CPTII,S$GLB,, | Performed by: ORTHOPAEDIC SURGERY

## 2023-07-27 PROCEDURE — 3008F PR BODY MASS INDEX (BMI) DOCUMENTED: ICD-10-PCS | Mod: CPTII,S$GLB,, | Performed by: ORTHOPAEDIC SURGERY

## 2023-07-27 PROCEDURE — 4010F PR ACE/ARB THEARPY RXD/TAKEN: ICD-10-PCS | Mod: CPTII,S$GLB,, | Performed by: ORTHOPAEDIC SURGERY

## 2023-07-27 PROCEDURE — 99203 PR OFFICE/OUTPT VISIT, NEW, LEVL III, 30-44 MIN: ICD-10-PCS | Mod: S$GLB,,, | Performed by: ORTHOPAEDIC SURGERY

## 2023-07-27 PROCEDURE — 1159F MED LIST DOCD IN RCRD: CPT | Mod: CPTII,S$GLB,, | Performed by: ORTHOPAEDIC SURGERY

## 2023-07-27 PROCEDURE — 1159F PR MEDICATION LIST DOCUMENTED IN MEDICAL RECORD: ICD-10-PCS | Mod: CPTII,S$GLB,, | Performed by: ORTHOPAEDIC SURGERY

## 2023-07-27 PROCEDURE — 3061F PR NEG MICROALBUMINURIA RESULT DOCUMENTED/REVIEW: ICD-10-PCS | Mod: CPTII,S$GLB,, | Performed by: ORTHOPAEDIC SURGERY

## 2023-07-27 PROCEDURE — 3061F NEG MICROALBUMINURIA REV: CPT | Mod: CPTII,S$GLB,, | Performed by: ORTHOPAEDIC SURGERY

## 2023-07-27 PROCEDURE — 3008F BODY MASS INDEX DOCD: CPT | Mod: CPTII,S$GLB,, | Performed by: ORTHOPAEDIC SURGERY

## 2023-07-27 PROCEDURE — 4010F ACE/ARB THERAPY RXD/TAKEN: CPT | Mod: CPTII,S$GLB,, | Performed by: ORTHOPAEDIC SURGERY

## 2023-07-27 NOTE — PROGRESS NOTES
[facility] ORTHOPEDICS    Subjective:     Chief Complaint:   Chief Complaint   Patient presents with    Clavicle Injury     Patient is here with complaints of Right Clavicle Fracture, has CT scan., states she had a fall around labor day       Past Medical History:   Diagnosis Date    Essential hypertension, malignant     Hypothyroid     Lung cancer     Primary cancer of right upper lobe of lung 2017    Stage IIIA NSCLC with sarcomatoid features.  Received neoadjuvant Cisplstin/Etop/XRT Surgery 2010. Patient had been in remission since that time.        Past Surgical History:   Procedure Laterality Date    APPENDECTOMY      HYSTERECTOMY      Right upper lobectomy.  Right     TUBAL LIGATION Bilateral        Current Outpatient Medications   Medication Sig    amino acids (AMINO ACID ORAL) Take by mouth.    EScitalopram oxalate (LEXAPRO) 20 MG tablet Take 1 tablet (20 mg total) by mouth once daily.    levothyroxine (SYNTHROID) 100 MCG tablet Take 100 mcg by mouth once daily.    losartan (COZAAR) 25 MG tablet Take 1 tablet (25 mg total) by mouth once daily.    magnesium oxide (MAG-OX) 400 mg (241.3 mg magnesium) tablet Take 400 mg by mouth.    multivitamin (THERAGRAN) per tablet Take 1 tablet by mouth.    omega-3/dha/epa/dpa/fish oil (OMEGA-3 2100 ORAL) Take by mouth.    rosuvastatin (CRESTOR) 5 MG tablet Take 1 tablet (5 mg total) by mouth 3 (three) times a week.    UBIQUINOL, BULK, MISC by Misc.(Non-Drug; Combo Route) route.     No current facility-administered medications for this visit.       Review of patient's allergies indicates:   Allergen Reactions    Flexeril [cyclobenzaprine] Hives       No family history on file.    Social History     Socioeconomic History    Marital status: Significant Other   Tobacco Use    Smoking status: Former     Packs/day: 1.30     Types: Cigarettes     Quit date:      Years since quittin.5    Smokeless tobacco: Never   Substance and Sexual Activity    Alcohol use: No     Drug use: No       History of present illness:  Ms. Pa comes in today as a new patient for an incidental finding of a clavicle fracture on a chest CT scan performed by her oncologist.  She does report she had a fall where she hurt her right shoulder about 9-10 months ago.  She had x-rays done at that time which were within normal limits she reports.  Her pain in the shoulder has gone on to resolve.  She reports no limitations or discomfort currently.  She was referred to Orthopedic simply to evaluate this incidental finding on CT scan.    Review of Systems:    Constitution: Negative for chills, fever, and sweats.  Negative for unexplained weight loss.    HENT:  Negative for headaches and blurry vision.    Cardiovascular:Negative for chest pain or irregular heart beat. Negative for hypertension.    Respiratory:  Negative for cough and shortness of breath.    Gastrointestinal: Negative for abdominal pain, heartburn, melena, nausea, and vomitting.    Genitourinary:  Negative bladder incontinence and dysuria.    Musculoskeletal:  See HPI for details.     Neurological: Negative for numbness.    Psychiatric/Behavioral: Negative for depression.  The patient is not nervous/anxious.      Endocrine: Negative for polyuria    Hematologic/Lymphatic: Negative for bleeding problem.  Does not bruise/bleed easily.    Skin: Negative for poor would healing and rash    Objective:      Physical Examination:    Vital Signs:  There were no vitals filed for this visit.    Body mass index is 27.34 kg/m².    This a well-developed, well nourished patient in no acute distress.  They are alert and oriented and cooperative to examination.        Right shoulder exam:  Skin to the right shoulder is clean dry and intact.  There is no erythema or ecchymosis.  There are no signs or symptoms of infection.  Patient is neurovascularly intact throughout the right upper extremity.  She is full range of motion of the right shoulder forward flexion,  "external rotation, internal rotation, and abduction.  Her right rotator cuff tendon is strong and intact to resisted muscle testing and nontender.  She has a negative Neer impingement sign as well as a negative Abreu test.  She is nontender over the right acromioclavicular joint and has a negative crossover test.      Pertinent New Results:    XRAY Report / Interpretation:   Two views were taken of the right clavicle today:  AP view and AP caudal.  They reveal no acute fractures or dislocations.  They do reveal a healed fracture of the proximal portion of the clavicle.  Visualized soft tissues appear unremarkable.    Assessment/Plan:      1. Right clavicle fracture, healed.      No particular intervention is warranted at this time as her fracture is healed.  She has no residual pain or limitations.  She can follow up with us on an as-needed basis for any issues or concerns that arise.    John Ortega, Physician Assistant, served in the capacity as a "scribe" for this patient encounter.  A "face-to-face" encounter occurred with Dr. Eleazar Nuñez on this date.  The treatment plan and medical decision-making is outlined above. Patient was seen and examined with a chaperone.       This note was created using Dragon voice recognition software that occasionally misinterpreted phrases or words.          "

## 2023-08-15 ENCOUNTER — TELEPHONE (OUTPATIENT)
Dept: FAMILY MEDICINE | Facility: CLINIC | Age: 63
End: 2023-08-15

## 2023-08-15 ENCOUNTER — HOSPITAL ENCOUNTER (OUTPATIENT)
Dept: RADIOLOGY | Facility: HOSPITAL | Age: 63
Discharge: HOME | End: 2023-08-15
Payer: MEDICARE

## 2023-08-15 DIAGNOSIS — Z12.31 SCREENING MAMMOGRAM FOR HIGH-RISK PATIENT: ICD-10-CM

## 2023-08-15 DIAGNOSIS — R92.8 ABNORMAL MAMMOGRAM: Primary | ICD-10-CM

## 2023-08-15 PROCEDURE — 77067 SCR MAMMO BI INCL CAD: CPT | Mod: TC,PO

## 2023-08-15 NOTE — PROGRESS NOTES
I saw this patient while still working under Dr Leon. He may want to review this before we contact her. Check with him. I would have someone call her and let her know that we need to do a diagnostic ultrasound, so they can have better visualization due to density of tissue, but let's see what he recommends.

## 2023-08-15 NOTE — TELEPHONE ENCOUNTER
----- Message from SHERLYN Olivier-ESPERANZA sent at 8/15/2023  1:37 PM CDT -----  I saw this patient while still working under Dr Leon. He may want to review this before we contact her. Check with him. I would have someone call her and let her know that we need to do a diagnostic ultrasound, so they can have better visualization due to density of tissue, but let's see what he recommends.

## 2023-08-15 NOTE — TELEPHONE ENCOUNTER
Spoke with pt in regards to mammogram results. Verbalized per Dr. Leon and Bere Kumari that a   diagnostic ultrasound is needed, so they can have better visualization due to density of tissue. Pt acknowledged understanding. Pt is willing to get ultrasound.

## 2023-08-20 NOTE — PROGRESS NOTES
Patient has been notified about the need for diagnostic mammogram and breast ultrasound right breast.

## 2023-08-21 ENCOUNTER — TELEPHONE (OUTPATIENT)
Dept: FAMILY MEDICINE | Facility: CLINIC | Age: 63
End: 2023-08-21

## 2023-08-21 NOTE — TELEPHONE ENCOUNTER
----- Message from Jared Leon MD sent at 8/20/2023  1:39 PM CDT -----  Patient has been notified about the need for diagnostic mammogram and breast ultrasound right breast.

## 2023-09-19 ENCOUNTER — TELEPHONE (OUTPATIENT)
Dept: FAMILY MEDICINE | Facility: CLINIC | Age: 63
End: 2023-09-19

## 2023-09-19 ENCOUNTER — HOSPITAL ENCOUNTER (OUTPATIENT)
Dept: RADIOLOGY | Facility: HOSPITAL | Age: 63
Discharge: HOME | End: 2023-09-19
Payer: MEDICARE

## 2023-09-19 DIAGNOSIS — R92.8 ABNORMAL MAMMOGRAM: ICD-10-CM

## 2023-09-19 PROCEDURE — 76642 ULTRASOUND BREAST LIMITED: CPT | Mod: TC,PO,RT

## 2023-09-19 NOTE — TELEPHONE ENCOUNTER
----- Message from JOLEEN Olivier sent at 9/19/2023  9:15 AM CDT -----  Please let Mrs. Rivera know that her breast ultrasound does show what appears to be a benign cyst in her right breast. Radiologist advises close follow up, so we will repeat in 6 months.

## 2023-09-19 NOTE — TELEPHONE ENCOUNTER
----- Message from Samantha Goldberg LPN sent at 9/19/2023  2:08 PM CDT -----    ----- Message -----  From: Bere Collazo  Sent: 9/19/2023   2:04 PM CDT  To: [facility] Staff    - 1:20-pt is calling nicci cosme   [phone]

## 2023-09-19 NOTE — PROGRESS NOTES
Please let Mrs. Rivera know that her breast ultrasound does show what appears to be a benign cyst in her right breast. Radiologist advises close follow up, so we will repeat in 6 months.

## 2023-09-20 NOTE — TELEPHONE ENCOUNTER
Patient Specific Counseling (Will Not Stick From Patient To Patient): Pt liver enzyme and chloride elevated. Used translation line to discuss results and entire patient visit. Patient was given copy of BW to take to GI/PCP. Spoke to patient with information verbatim per Dr Leon. Verbalized understanding we will call her in 3 months for repeat ultrasound. Remind me created.   Detail Level: Detailed

## 2024-01-26 ENCOUNTER — OFFICE VISIT (OUTPATIENT)
Dept: HEMATOLOGY/ONCOLOGY | Facility: CLINIC | Age: 64
End: 2024-01-26
Payer: MEDICARE

## 2024-01-26 VITALS
RESPIRATION RATE: 16 BRPM | TEMPERATURE: 97 F | WEIGHT: 145.81 LBS | DIASTOLIC BLOOD PRESSURE: 69 MMHG | BODY MASS INDEX: 28.47 KG/M2 | HEART RATE: 73 BPM | SYSTOLIC BLOOD PRESSURE: 136 MMHG

## 2024-01-26 DIAGNOSIS — Z85.118 HISTORY OF LUNG CANCER: Primary | ICD-10-CM

## 2024-01-26 PROCEDURE — 3008F BODY MASS INDEX DOCD: CPT | Mod: CPTII,S$GLB,, | Performed by: INTERNAL MEDICINE

## 2024-01-26 PROCEDURE — 3075F SYST BP GE 130 - 139MM HG: CPT | Mod: CPTII,S$GLB,, | Performed by: INTERNAL MEDICINE

## 2024-01-26 PROCEDURE — 99213 OFFICE O/P EST LOW 20 MIN: CPT | Mod: S$GLB,,, | Performed by: INTERNAL MEDICINE

## 2024-01-26 PROCEDURE — 1159F MED LIST DOCD IN RCRD: CPT | Mod: CPTII,S$GLB,, | Performed by: INTERNAL MEDICINE

## 2024-01-26 PROCEDURE — 3078F DIAST BP <80 MM HG: CPT | Mod: CPTII,S$GLB,, | Performed by: INTERNAL MEDICINE

## 2024-01-26 NOTE — PROGRESS NOTES
PROGRESS NOTE    Subjective:       Patient ID: Ember Delgado is a 63 y.o. female.    Chief Complaint:  No chief complaint on file.  lung cancer history.   Primary cancer of right upper lobe of lung (Chronic)        Stage IIIA NSCLC with sarcomatoid features.   Received neoadjuvant Cisplstin/Etop/XRT  Surgery 2/2010. Patient had been in remission since that time.           History of Present Illness:   Ember Delgado is a 63 y.o. female who presents for yearly follow up.     Ms. Delgado is feeling well today and has no new complaints.     g    Family and Social history reviewed and is unchanged from 2/27/2017.       ROS:  Review of Systems   Constitutional:  Negative for appetite change, fever and unexpected weight change.   HENT:  Negative for mouth sores.    Eyes:  Negative for visual disturbance.   Respiratory:  Negative for cough and shortness of breath.    Cardiovascular:  Negative for chest pain and leg swelling.   Gastrointestinal:  Negative for abdominal pain, blood in stool and diarrhea.   Genitourinary:  Negative for frequency and hematuria.   Musculoskeletal:  Negative for back pain.   Skin:  Negative for rash.   Neurological:  Negative for headaches.   Hematological:  Negative for adenopathy.   Psychiatric/Behavioral:  The patient is not nervous/anxious.           Current Outpatient Medications:     amino acids (AMINO ACID ORAL), Take by mouth., Disp: , Rfl:     EScitalopram oxalate (LEXAPRO) 20 MG tablet, Take 1 tablet (20 mg total) by mouth once daily., Disp: 90 tablet, Rfl: 3    levothyroxine (SYNTHROID) 100 MCG tablet, Take 100 mcg by mouth once daily., Disp: , Rfl:     losartan (COZAAR) 25 MG tablet, Take 1 tablet (25 mg total) by mouth once daily., Disp: 90 tablet, Rfl: 3    multivitamin (THERAGRAN) per tablet, Take 1 tablet by mouth., Disp: , Rfl:     omega-3/dha/epa/dpa/fish oil (OMEGA-3 2100 ORAL), Take by mouth., Disp: ,  Rfl:     rosuvastatin (CRESTOR) 5 MG tablet, Take 1 tablet (5 mg total) by mouth 3 (three) times a week., Disp: 90 tablet, Rfl: 3    UBIQUINOL, BULK, MISC, by Misc.(Non-Drug; Combo Route) route., Disp: , Rfl:         Objective:       Physical Examination:     /69   Pulse 73   Temp 97.2 °F (36.2 °C)   Resp 16   Wt 66.1 kg (145 lb 12.8 oz)   BMI 28.47 kg/m²     Physical Exam  Constitutional:       Appearance: She is well-developed.   HENT:      Head: Normocephalic and atraumatic.      Right Ear: External ear normal.      Left Ear: External ear normal.   Eyes:      Conjunctiva/sclera: Conjunctivae normal.      Pupils: Pupils are equal, round, and reactive to light.   Neck:      Thyroid: No thyromegaly.      Trachea: No tracheal deviation.   Cardiovascular:      Rate and Rhythm: Normal rate and regular rhythm.      Heart sounds: Normal heart sounds.   Pulmonary:      Effort: Pulmonary effort is normal.      Breath sounds: Normal breath sounds.   Abdominal:      General: Bowel sounds are normal. There is no distension.      Palpations: Abdomen is soft. There is no mass.      Tenderness: There is no abdominal tenderness.   Skin:     Findings: No rash.   Neurological:      Comments: Neuro intact througout   Psychiatric:         Behavior: Behavior normal.         Thought Content: Thought content normal.         Judgment: Judgment normal.         Labs:   No results found for this or any previous visit (from the past 336 hour(s)).  CMP  Sodium   Date Value Ref Range Status   06/15/2023 140 135 - 146 mmol/L Final     Potassium   Date Value Ref Range Status   06/15/2023 5.2 3.5 - 5.3 mmol/L Final     Chloride   Date Value Ref Range Status   06/15/2023 103 98 - 110 mmol/L Final     CO2   Date Value Ref Range Status   06/15/2023 29 20 - 32 mmol/L Final     Glucose   Date Value Ref Range Status   06/15/2023 91 65 - 99 mg/dL Final     Comment:                   Fasting reference interval          BUN   Date Value Ref  "Range Status   06/15/2023 23 7 - 25 mg/dL Final     Creatinine   Date Value Ref Range Status   06/15/2023 0.71 0.50 - 1.05 mg/dL Final     Calcium   Date Value Ref Range Status   06/15/2023 9.2 8.6 - 10.4 mg/dL Final     Total Protein   Date Value Ref Range Status   06/15/2023 6.8 6.1 - 8.1 g/dL Final     Albumin   Date Value Ref Range Status   06/15/2023 4.5 3.6 - 5.1 g/dL Final     Total Bilirubin   Date Value Ref Range Status   06/15/2023 0.5 0.2 - 1.2 mg/dL Final     AST   Date Value Ref Range Status   06/15/2023 14 10 - 35 U/L Final     ALT   Date Value Ref Range Status   06/15/2023 14 6 - 29 U/L Final     No results found for: "CEA"  No results found for: "PSA"        Assessment/Plan:     Problem List Items Addressed This Visit       History of lung cancer - Primary     Patient is doing well and will continue to see her yearly and get yearly CT scanning.  Next due in summeer 2024.  Will arrange.  Discussed this today.          Relevant Orders    CT Chest Without Contrast       Discussion:     Follow up in about 1 year (around 1/26/2025).      Electronically signed by Denis Ferrara            "

## 2024-01-26 NOTE — ASSESSMENT & PLAN NOTE
Patient is doing well and will continue to see her yearly and get yearly CT scanning.  Next due in summeer 2024.  Will arrange.  Discussed this today.

## 2024-03-19 ENCOUNTER — TELEPHONE (OUTPATIENT)
Dept: FAMILY MEDICINE | Facility: CLINIC | Age: 64
End: 2024-03-19
Payer: MEDICARE

## 2024-03-19 DIAGNOSIS — R92.8 ABNORMAL MAMMOGRAM: Primary | ICD-10-CM

## 2024-03-19 DIAGNOSIS — R92.8 ABNORMAL FINDING ON BREAST IMAGING: ICD-10-CM

## 2024-03-19 NOTE — TELEPHONE ENCOUNTER
US Breast right limited, pended to [facility] Need dx code. Nurse to call patient when approved.

## 2024-03-19 NOTE — TELEPHONE ENCOUNTER
Spoke with patient informed referral for US Breast Right Limited was placed and imaging center will call to set appt. Patient verbalized understanding.

## 2024-03-19 NOTE — TELEPHONE ENCOUNTER
----- Message from Rebecca Zee LPN sent at 9/19/2023 10:18 AM CDT -----  Regarding: US right breast  Please let Mrs. Rivera know that her breast ultrasound does show what appears to be a benign cyst in her right breast. Radiologist advises close follow up, so we will repeat in 6 months.

## 2024-06-06 ENCOUNTER — TELEPHONE (OUTPATIENT)
Dept: FAMILY MEDICINE | Facility: CLINIC | Age: 64
End: 2024-06-06
Payer: MEDICARE

## 2024-06-06 DIAGNOSIS — Z78.0 MENOPAUSE: ICD-10-CM

## 2024-06-06 DIAGNOSIS — I10 ESSENTIAL HYPERTENSION: ICD-10-CM

## 2024-06-06 DIAGNOSIS — Z79.899 ENCOUNTER FOR LONG-TERM (CURRENT) USE OF OTHER MEDICATIONS: Primary | ICD-10-CM

## 2024-06-06 DIAGNOSIS — R73.9 HYPERGLYCEMIA: ICD-10-CM

## 2024-06-06 DIAGNOSIS — E03.9 ACQUIRED HYPOTHYROIDISM: ICD-10-CM

## 2024-06-06 DIAGNOSIS — E78.2 MIXED HYPERLIPIDEMIA: ICD-10-CM

## 2024-06-11 ENCOUNTER — HOSPITAL ENCOUNTER (OUTPATIENT)
Dept: RADIOLOGY | Facility: HOSPITAL | Age: 64
Discharge: HOME | End: 2024-06-11
Payer: MEDICARE

## 2024-06-11 DIAGNOSIS — R92.8 ABNORMAL MAMMOGRAM: ICD-10-CM

## 2024-06-11 DIAGNOSIS — R92.8 ABNORMAL FINDING ON BREAST IMAGING: ICD-10-CM

## 2024-06-11 PROCEDURE — 76642 ULTRASOUND BREAST LIMITED: CPT | Mod: 26,RT,, | Performed by: RADIOLOGY

## 2024-06-11 PROCEDURE — 76642 ULTRASOUND BREAST LIMITED: CPT | Mod: TC,PO,RT

## 2024-06-13 ENCOUNTER — TELEPHONE (OUTPATIENT)
Dept: FAMILY MEDICINE | Facility: CLINIC | Age: 64
End: 2024-06-13
Payer: MEDICARE

## 2024-06-13 DIAGNOSIS — R92.8 ABNORMAL FINDING ON BREAST IMAGING: Primary | ICD-10-CM

## 2024-06-13 NOTE — PROGRESS NOTES
"Please let Mrs. Delgado know that her breast ultrasound shows no changes in area we are monitoring. She will be due for regular, yearly screening mammogram in August and the radiologist does state that the nodule in question is "probably benign," and however, I recommend we go ahead and do a diagnostic mammogram just to be sure, but ultimately it is up to her. If she wants to go ahead with diagnostic now, let me know. If not, we will do in August, with another ultrasound, also. "

## 2024-06-13 NOTE — TELEPHONE ENCOUNTER
Spoke with pt verbatim per Bere. Pt voiced understanding and states she would like to wait until August to repeat the mammo and US.

## 2024-06-13 NOTE — TELEPHONE ENCOUNTER
"----- Message from SHERLYN Olivier-CNP sent at 6/13/2024  8:55 AM CDT -----  Please let Mrs. Delgado know that her breast ultrasound shows no changes in area we are monitoring. She will be due for regular, yearly screening mammogram in August and the radiologist does state that the nodule in question is "probably benign," and however, I recommend we go ahead and do a diagnostic mammogram just to be sure, but ultimately it is up to her. If she wants to go ahead with diagnostic now, let me know. If not, we will do in August, with another ultrasound, also.   "

## 2024-06-19 ENCOUNTER — OFFICE VISIT (OUTPATIENT)
Dept: FAMILY MEDICINE | Facility: CLINIC | Age: 64
End: 2024-06-19
Attending: FAMILY MEDICINE
Payer: MEDICARE

## 2024-06-19 VITALS
HEART RATE: 87 BPM | SYSTOLIC BLOOD PRESSURE: 126 MMHG | WEIGHT: 140 LBS | BODY MASS INDEX: 27.48 KG/M2 | HEIGHT: 60 IN | DIASTOLIC BLOOD PRESSURE: 78 MMHG

## 2024-06-19 DIAGNOSIS — E03.9 ACQUIRED HYPOTHYROIDISM: ICD-10-CM

## 2024-06-19 DIAGNOSIS — E78.2 MIXED HYPERLIPIDEMIA: ICD-10-CM

## 2024-06-19 DIAGNOSIS — Z85.118 HISTORY OF LUNG CANCER: ICD-10-CM

## 2024-06-19 DIAGNOSIS — Z78.0 MENOPAUSE: ICD-10-CM

## 2024-06-19 DIAGNOSIS — I10 ESSENTIAL HYPERTENSION: ICD-10-CM

## 2024-06-19 DIAGNOSIS — N95.1 HOT FLASHES DUE TO MENOPAUSE: Primary | ICD-10-CM

## 2024-06-19 DIAGNOSIS — F41.9 ANXIETY: ICD-10-CM

## 2024-06-19 DIAGNOSIS — Z12.31 SCREENING MAMMOGRAM FOR HIGH-RISK PATIENT: ICD-10-CM

## 2024-06-19 PROCEDURE — 4010F ACE/ARB THERAPY RXD/TAKEN: CPT | Mod: CPTII,S$GLB,, | Performed by: FAMILY MEDICINE

## 2024-06-19 PROCEDURE — 3078F DIAST BP <80 MM HG: CPT | Mod: CPTII,S$GLB,, | Performed by: FAMILY MEDICINE

## 2024-06-19 PROCEDURE — 99214 OFFICE O/P EST MOD 30 MIN: CPT | Mod: S$GLB,,, | Performed by: FAMILY MEDICINE

## 2024-06-19 PROCEDURE — 3074F SYST BP LT 130 MM HG: CPT | Mod: CPTII,S$GLB,, | Performed by: FAMILY MEDICINE

## 2024-06-19 PROCEDURE — 1159F MED LIST DOCD IN RCRD: CPT | Mod: CPTII,S$GLB,, | Performed by: FAMILY MEDICINE

## 2024-06-19 PROCEDURE — 3008F BODY MASS INDEX DOCD: CPT | Mod: CPTII,S$GLB,, | Performed by: FAMILY MEDICINE

## 2024-06-19 RX ORDER — ESCITALOPRAM OXALATE 20 MG/1
10 TABLET ORAL DAILY
Qty: 45 TABLET | Refills: 0 | Status: SHIPPED | OUTPATIENT
Start: 2024-06-19 | End: 2025-06-19

## 2024-06-19 RX ORDER — LEVOTHYROXINE SODIUM 100 UG/1
100 TABLET ORAL DAILY
Qty: 90 TABLET | Refills: 1 | Status: SHIPPED | OUTPATIENT
Start: 2024-06-19

## 2024-06-19 RX ORDER — LOSARTAN POTASSIUM 25 MG/1
25 TABLET ORAL DAILY
Qty: 90 TABLET | Refills: 3 | Status: SHIPPED | OUTPATIENT
Start: 2024-06-19 | End: 2025-06-14

## 2024-06-19 RX ORDER — VENLAFAXINE HYDROCHLORIDE 37.5 MG/1
37.5 CAPSULE, EXTENDED RELEASE ORAL DAILY
Qty: 30 CAPSULE | Refills: 3 | Status: SHIPPED | OUTPATIENT
Start: 2024-06-19 | End: 2025-06-19

## 2024-06-19 NOTE — PROGRESS NOTES
SUBJECTIVE:    Patient ID: Ember Delgado is a 63 y.o. female.    Chief Complaint: Follow-up (No bottles, anger, need refills, abc )    63-year-old female here for six-month checkup.  2010 she went into menopause.  She is still experiencing hot flashes.  She only sweats on the left 1/2 of her body since her thoracotomy to remove a right-sided lung cancer.  No sweating on the right side of her body.    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    She has seen Dr. Ortega for all her labs at [facility]    Follow-up mammogram and ultrasound planned in 3 months    2021-colonoscopy with Dr. [facility] 5 years    Follow-up  Associated symptoms include headaches. Pertinent negatives include no arthralgias, chest pain, joint swelling, neck pain, vomiting or weakness.       No visits with results within 6 Month(s) from this visit.   Latest known visit with results is:   Orders Only on 06/15/2023   Component Date Value Ref Range Status    Cholesterol 06/15/2023 195  <200 mg/dL Final    HDL 06/15/2023 58  > OR = 50 mg/dL Final    Triglycerides 06/15/2023 81  <150 mg/dL Final    LDL Cholesterol 06/15/2023 119 (H)  mg/dL (calc) Final    HDL/Cholesterol Ratio 06/15/2023 3.4  <5.0 (calc) Final    Non HDL Chol. (LDL+VLDL) 06/15/2023 137 (H)  <130 mg/dL (calc) Final    Glucose 06/15/2023 91  65 - 99 mg/dL Final    BUN 06/15/2023 23  7 - 25 mg/dL Final    Creatinine 06/15/2023 0.71  0.50 - 1.05 mg/dL Final    eGFR 06/15/2023 96  > OR = 60 mL/min/1.73m2 Final    BUN/Creatinine Ratio 06/15/2023 NOT APPLICABLE  6 - 22 (calc) Final    Sodium 06/15/2023 140  135 - 146 mmol/L Final    Potassium 06/15/2023 5.2  3.5 - 5.3 mmol/L Final    Chloride 06/15/2023 103  98 - 110 mmol/L Final    CO2 06/15/2023 29  20 - 32 mmol/L Final    Calcium 06/15/2023 9.2  8.6 - 10.4 mg/dL Final    Total Protein 06/15/2023 6.8  6.1 - 8.1 g/dL Final    Albumin 06/15/2023 4.5   3.6 - 5.1 g/dL Final    Globulin, Total 06/15/2023 2.3  1.9 - 3.7 g/dL (calc) Final    Albumin/Globulin Ratio 06/15/2023 2.0  1.0 - 2.5 (calc) Final    Total Bilirubin 06/15/2023 0.5  0.2 - 1.2 mg/dL Final    Alkaline Phosphatase 06/15/2023 69  37 - 153 U/L Final    AST 06/15/2023 14  10 - 35 U/L Final    ALT 06/15/2023 14  6 - 29 U/L Final    Creatinine, Urine 06/15/2023 86  20 - 275 mg/dL Final    Microalb, Ur 06/15/2023 0.2  See Note: mg/dL Final    Microalb/Creat Ratio 06/15/2023 2  <30 mcg/mg creat Final    Color, UA 06/15/2023 YELLOW  YELLOW Final    Appearance, UA 06/15/2023 CLEAR  CLEAR Final    Specific Gravity, UA 06/15/2023 1.020  1.001 - 1.035 Final    pH, UA 06/15/2023 6.0  5.0 - 8.0 Final    Glucose, UA 06/15/2023 NEGATIVE  NEGATIVE Final    Bilirubin, UA 06/15/2023 NEGATIVE  NEGATIVE Final    Ketones, UA 06/15/2023 NEGATIVE  NEGATIVE Final    Occult Blood UA 06/15/2023 NEGATIVE  NEGATIVE Final    Protein, UA 06/15/2023 NEGATIVE  NEGATIVE Final    Nitrite, UA 06/15/2023 NEGATIVE  NEGATIVE Final    Leukocytes, UA 06/15/2023 NEGATIVE  NEGATIVE Final    WBC Casts, UA 06/15/2023 NONE SEEN  < OR = 5 /HPF Final    RBC Casts, UA 06/15/2023 NONE SEEN  < OR = 2 /HPF Final    Squam Epithel, UA 06/15/2023 NONE SEEN  < OR = 5 /HPF Final    Bacteria, UA 06/15/2023 NONE SEEN  NONE SEEN /HPF Final    Hyaline Casts, UA 06/15/2023 NONE SEEN  NONE SEEN /LPF Final    Service Cmt: 06/15/2023    Final    Reflexive Urine Culture 06/15/2023    Final    WBC 06/15/2023 6.3  3.8 - 10.8 Thousand/uL Final    RBC 06/15/2023 4.92  3.80 - 5.10 Million/uL Final    Hemoglobin 06/15/2023 14.0  11.7 - 15.5 g/dL Final    Hematocrit 06/15/2023 42.4  35.0 - 45.0 % Final    MCV 06/15/2023 86.2  80.0 - 100.0 fL Final    MCH 06/15/2023 28.5  27.0 - 33.0 pg Final    MCHC 06/15/2023 33.0  32.0 - 36.0 g/dL Final    RDW 06/15/2023 13.8  11.0 - 15.0 % Final    Platelets 06/15/2023 258  140 - 400 Thousand/uL Final    MPV 06/15/2023 9.9  7.5 - 12.5  fL Final    Neutrophils, Abs 06/15/2023 3,421  1,500 - 7,800 cells/uL Final    Lymph # 06/15/2023 2,048  850 - 3,900 cells/uL Final    Mono # 06/15/2023 504  200 - 950 cells/uL Final    Eos # 06/15/2023 290  15 - 500 cells/uL Final    Baso # 06/15/2023 38  0 - 200 cells/uL Final    Neutrophils Relative 06/15/2023 54.3  % Final    Lymph % 06/15/2023 32.5  % Final    Mono % 06/15/2023 8.0  % Final    Eosinophil % 06/15/2023 4.6  % Final    Basophil % 06/15/2023 0.6  % Final    TSH 06/15/2023 0.69  0.40 - 4.50 mIU/L Final       Past Medical History:   Diagnosis Date    Essential hypertension, malignant     Hypothyroid     Lung cancer     Primary cancer of right upper lobe of lung 2017    Stage IIIA NSCLC with sarcomatoid features.  Received neoadjuvant Cisplstin/Etop/XRT Surgery 2010. Patient had been in remission since that time.      Social History     Socioeconomic History    Marital status: Significant Other   Tobacco Use    Smoking status: Former     Current packs/day: 0.00     Types: Cigarettes     Quit date:      Years since quittin.4    Smokeless tobacco: Never   Substance and Sexual Activity    Alcohol use: No    Drug use: No     Social Determinants of Health     Financial Resource Strain: Low Risk  (2024)    Overall Financial Resource Strain (CARDIA)     Difficulty of Paying Living Expenses: Not very hard   Food Insecurity: No Food Insecurity (2024)    Hunger Vital Sign     Worried About Running Out of Food in the Last Year: Never true     Ran Out of Food in the Last Year: Never true   Transportation Needs: No Transportation Needs (2024)    PRAPARE - Transportation     Lack of Transportation (Medical): No     Lack of Transportation (Non-Medical): No   Physical Activity: Insufficiently Active (2024)    Exercise Vital Sign     Days of Exercise per Week: 2 days     Minutes of Exercise per Session: 30 min   Stress: No Stress Concern Present (2024)    [facility] - Occupational Stress Questionnaire     Feeling of Stress : Only a little   Housing Stability: Low Risk  (1/23/2024)    Housing Stability Vital Sign     Unable to Pay for Housing in the Last Year: No     Number of Places Lived in the Last Year: 1     Unstable Housing in the Last Year: No     Past Surgical History:   Procedure Laterality Date    APPENDECTOMY      HYSTERECTOMY      Right upper lobectomy.  Right     TUBAL LIGATION Bilateral      No family history on file.    The 10-year CVD risk score (LYNAgobarbara, et al., 2008) is: 9%    Values used to calculate the score:      Age: 63 years      Sex: Female      Diabetic: No      Tobacco smoker: No      Systolic Blood Pressure: 126 mmHg      Is BP treated: Yes      HDL Cholesterol: 58 mg/dL      Total Cholesterol: 195 mg/dL    All of your core healthy metrics are met.      Review of patient's allergies indicates:   Allergen Reactions    Flexeril [cyclobenzaprine] Hives       Current Outpatient Medications:     amino acids (AMINO ACID ORAL), Take by mouth., Disp: , Rfl:     multivitamin (THERAGRAN) per tablet, Take 1 tablet by mouth., Disp: , Rfl:     omega-3/dha/epa/dpa/fish oil (OMEGA-3 2100 ORAL), Take by mouth., Disp: , Rfl:     rosuvastatin (CRESTOR) 5 MG tablet, Take 1 tablet (5 mg total) by mouth 3 (three) times a week., Disp: 90 tablet, Rfl: 3    UBIQUINOL, BULK, MISC, by Misc.(Non-Drug; Combo Route) route., Disp: , Rfl:     EScitalopram oxalate (LEXAPRO) 20 MG tablet, Take 0.5 tablets (10 mg total) by mouth once daily., Disp: 45 tablet, Rfl: 0    levothyroxine (SYNTHROID) 100 MCG tablet, Take 1 tablet (100 mcg total) by mouth once daily., Disp: 90 tablet, Rfl: 1    losartan (COZAAR) 25 MG tablet, Take 1 tablet (25 mg total) by mouth once daily., Disp: 90 tablet, Rfl: 3    venlafaxine (EFFEXOR-XR) 37.5 MG 24 hr capsule, Take 1 capsule (37.5 mg total) by mouth once daily., Disp: 30 capsule, Rfl: 3    Review of Systems   Constitutional:   Negative for activity change and unexpected weight change.   HENT:  Negative for hearing loss, rhinorrhea and trouble swallowing.    Eyes:  Negative for discharge and visual disturbance.   Respiratory:  Negative for chest tightness and wheezing.    Cardiovascular:  Negative for chest pain and palpitations.   Gastrointestinal:  Negative for blood in stool, constipation, diarrhea and vomiting.   Endocrine: Negative for polydipsia and polyuria.   Genitourinary:  Positive for hot flashes. Negative for difficulty urinating, dysuria, hematuria and menstrual problem.   Musculoskeletal:  Negative for arthralgias, joint swelling and neck pain.   Neurological:  Positive for headaches. Negative for weakness.   Psychiatric/Behavioral:  Negative for confusion and dysphoric mood. The patient is nervous/anxious.            Objective:      Vitals:    06/19/24 1114   BP: 126/78   Pulse: 87   Weight: 63.5 kg (140 lb)   Height: 5' (1.524 m)     Physical Exam  Vitals and nursing note reviewed.   Constitutional:       General: She is not in acute distress.     Appearance: Normal appearance. She is well-developed. She is not toxic-appearing.   HENT:      Head: Normocephalic and atraumatic.      Right Ear: Tympanic membrane and external ear normal.      Left Ear: Tympanic membrane and external ear normal.      Nose: Nose normal.      Mouth/Throat:      Pharynx: Oropharynx is clear.   Eyes:      Pupils: Pupils are equal, round, and reactive to light.   Neck:      Thyroid: No thyromegaly.      Vascular: No carotid bruit.   Cardiovascular:      Rate and Rhythm: Normal rate and regular rhythm.      Heart sounds: Normal heart sounds. No murmur heard.  Pulmonary:      Effort: Pulmonary effort is normal.      Breath sounds: Normal breath sounds. No wheezing or rales.   Abdominal:      General: Bowel sounds are normal. There is no distension.      Palpations: Abdomen is soft.      Tenderness: There is no abdominal tenderness.   Musculoskeletal:          General: No tenderness or deformity. Normal range of motion.      Cervical back: Normal range of motion and neck supple.      Lumbar back: Normal. No spasms.      Comments: Bends 90 degrees at  waist shoulders and knees good range of motion without crepitance.  No pitting edema to lower extremities   Lymphadenopathy:      Cervical: No cervical adenopathy.   Skin:     General: Skin is warm and dry.      Findings: No rash.   Neurological:      Mental Status: She is alert and oriented to person, place, and time. Mental status is at baseline.      Cranial Nerves: No cranial nerve deficit.      Coordination: Coordination normal.   Psychiatric:         Mood and Affect: Mood is anxious.         Behavior: Behavior normal. Behavior is cooperative.         Thought Content: Thought content normal.         Cognition and Memory: Cognition normal.         Judgment: Judgment normal.           Assessment:       1. Hot flashes due to menopause    2. Anxiety    3. Essential hypertension    4. Menopause    5. Screening mammogram for high-risk patient    6. Mixed hyperlipidemia    7. History of lung cancer    8. Acquired hypothyroidism         Plan:       Hot flashes due to menopause  Trial of Effexor 37.5 mg daily, taper down escitalopram to 10 mg daily, we can eventually wean off citalopram if Effexor is doing well  Anxiety  -     EScitalopram oxalate (LEXAPRO) 20 MG tablet; Take 0.5 tablets (10 mg total) by mouth once daily.  Dispense: 45 tablet; Refill: 0  Reduce escitalopram to 10 mg  Essential hypertension  -     losartan (COZAAR) 25 MG tablet; Take 1 tablet (25 mg total) by mouth once daily.  Dispense: 90 tablet; Refill: 3    Menopause  -     venlafaxine (EFFEXOR-XR) 37.5 MG 24 hr capsule; Take 1 capsule (37.5 mg total) by mouth once daily.  Dispense: 30 capsule; Refill: 3    Screening mammogram for high-risk patient  Ultrasound and mammogram ordered for 3 months  Mixed hyperlipidemia  Will get copy of her [facility]  results  History of lung cancer  In remission  Acquired hypothyroidism  -     levothyroxine (SYNTHROID) 100 MCG tablet; Take 1 tablet (100 mcg total) by mouth once daily.  Dispense: 90 tablet; Refill: 1  Continue levothyroxine    Follow up in about 1 year (around 6/19/2025).        6/25/2024 Jared Leon

## 2024-06-21 ENCOUNTER — TELEPHONE (OUTPATIENT)
Dept: FAMILY MEDICINE | Facility: CLINIC | Age: 64
End: 2024-06-21

## 2024-06-25 PROBLEM — N95.1 HOT FLASHES DUE TO MENOPAUSE: Status: ACTIVE | Noted: 2024-06-25

## 2024-07-10 ENCOUNTER — TELEPHONE (OUTPATIENT)
Dept: FAMILY MEDICINE | Facility: CLINIC | Age: 64
End: 2024-07-10
Payer: MEDICARE

## 2024-08-15 ENCOUNTER — HOSPITAL ENCOUNTER (OUTPATIENT)
Dept: RADIOLOGY | Facility: HOSPITAL | Age: 64
Discharge: HOME | End: 2024-08-15
Attending: INTERNAL MEDICINE
Payer: MEDICARE

## 2024-08-15 ENCOUNTER — HOSPITAL ENCOUNTER (OUTPATIENT)
Dept: RADIOLOGY | Facility: HOSPITAL | Age: 64
Discharge: HOME | End: 2024-08-15
Payer: MEDICARE

## 2024-08-15 DIAGNOSIS — Z85.118 HISTORY OF LUNG CANCER: ICD-10-CM

## 2024-08-15 DIAGNOSIS — R92.8 ABNORMAL FINDING ON BREAST IMAGING: ICD-10-CM

## 2024-08-15 DIAGNOSIS — R92.8 ABNORMAL MAMMOGRAM: ICD-10-CM

## 2024-08-15 DIAGNOSIS — R92.8 ABNORMAL FINDING ON BREAST IMAGING: Primary | ICD-10-CM

## 2024-08-15 PROCEDURE — 76642 ULTRASOUND BREAST LIMITED: CPT | Mod: 26,RT,, | Performed by: RADIOLOGY

## 2024-08-15 PROCEDURE — 71250 CT THORAX DX C-: CPT | Mod: TC,PO

## 2024-08-15 PROCEDURE — 77066 DX MAMMO INCL CAD BI: CPT | Mod: 26,,, | Performed by: RADIOLOGY

## 2024-08-15 PROCEDURE — 76642 ULTRASOUND BREAST LIMITED: CPT | Mod: TC,PO,RT

## 2024-08-15 PROCEDURE — 77062 BREAST TOMOSYNTHESIS BI: CPT | Mod: 26,,, | Performed by: RADIOLOGY

## 2024-08-15 PROCEDURE — 77066 DX MAMMO INCL CAD BI: CPT | Mod: TC,PO

## 2024-08-15 NOTE — PROGRESS NOTES
Nodule is stable. This means no growth, no change, looks benign at this point, and we will recheck in 6 months to ensure this stays the same.

## 2024-08-16 ENCOUNTER — TELEPHONE (OUTPATIENT)
Dept: FAMILY MEDICINE | Facility: CLINIC | Age: 64
End: 2024-08-16
Payer: MEDICARE

## 2024-08-16 NOTE — TELEPHONE ENCOUNTER
----- Message from Samantha Wong MA sent at 8/16/2024  9:18 AM CDT -----  Patient is returning your call    Pt: [phone]  -DN

## 2024-08-16 NOTE — TELEPHONE ENCOUNTER
----- Message from JOLEEN Olivier sent at 8/15/2024  5:04 PM CDT -----  Nodule is stable. This means no growth, no change, looks benign at this point, and we will recheck in 6 months to ensure this stays the same.

## 2025-02-17 ENCOUNTER — HOSPITAL ENCOUNTER (OUTPATIENT)
Dept: RADIOLOGY | Facility: HOSPITAL | Age: 65
Discharge: HOME | End: 2025-02-17
Payer: MEDICARE

## 2025-02-17 ENCOUNTER — RESULTS FOLLOW-UP (OUTPATIENT)
Dept: FAMILY MEDICINE | Facility: CLINIC | Age: 65
End: 2025-02-17

## 2025-02-17 DIAGNOSIS — R92.8 ABNORMAL MAMMOGRAM: ICD-10-CM

## 2025-02-17 DIAGNOSIS — R92.8 ABNORMAL FINDING ON BREAST IMAGING: ICD-10-CM

## 2025-02-17 PROCEDURE — 76642 ULTRASOUND BREAST LIMITED: CPT | Mod: TC,PO,RT

## 2025-02-17 NOTE — TELEPHONE ENCOUNTER
----- Message from JOLEEN Olivier sent at 2/17/2025  8:34 AM CST -----  Known nodule unchanged and appears benign.   ----- Message -----  From: Interface, Rad Results In  Sent: 2/17/2025   8:26 AM CST  To: JOLEEN Olivier

## 2025-06-12 ENCOUNTER — TELEPHONE (OUTPATIENT)
Dept: FAMILY MEDICINE | Facility: CLINIC | Age: 65
End: 2025-06-12
Payer: MEDICARE

## 2025-06-25 ENCOUNTER — OFFICE VISIT (OUTPATIENT)
Dept: FAMILY MEDICINE | Facility: CLINIC | Age: 65
End: 2025-06-25
Payer: MEDICARE

## 2025-06-25 VITALS
DIASTOLIC BLOOD PRESSURE: 60 MMHG | BODY MASS INDEX: 25.13 KG/M2 | RESPIRATION RATE: 18 BRPM | OXYGEN SATURATION: 97 % | HEART RATE: 78 BPM | WEIGHT: 128 LBS | SYSTOLIC BLOOD PRESSURE: 102 MMHG | HEIGHT: 60 IN

## 2025-06-25 DIAGNOSIS — F41.9 ANXIETY: ICD-10-CM

## 2025-06-25 DIAGNOSIS — Z12.31 OTHER SCREENING MAMMOGRAM: ICD-10-CM

## 2025-06-25 DIAGNOSIS — I10 ESSENTIAL HYPERTENSION: Primary | ICD-10-CM

## 2025-06-25 DIAGNOSIS — E03.9 ACQUIRED HYPOTHYROIDISM: ICD-10-CM

## 2025-06-25 DIAGNOSIS — Z85.118 HISTORY OF LUNG CANCER: ICD-10-CM

## 2025-06-25 DIAGNOSIS — N60.11 FIBROCYSTIC CHANGES OF RIGHT BREAST: ICD-10-CM

## 2025-06-25 DIAGNOSIS — E78.2 MIXED HYPERLIPIDEMIA: ICD-10-CM

## 2025-06-25 DIAGNOSIS — Z78.0 MENOPAUSE: ICD-10-CM

## 2025-06-25 PROCEDURE — 1159F MED LIST DOCD IN RCRD: CPT | Mod: CPTII,S$GLB,, | Performed by: FAMILY MEDICINE

## 2025-06-25 PROCEDURE — 99214 OFFICE O/P EST MOD 30 MIN: CPT | Mod: S$GLB,,, | Performed by: FAMILY MEDICINE

## 2025-06-25 PROCEDURE — 3008F BODY MASS INDEX DOCD: CPT | Mod: CPTII,S$GLB,, | Performed by: FAMILY MEDICINE

## 2025-06-25 PROCEDURE — 3078F DIAST BP <80 MM HG: CPT | Mod: CPTII,S$GLB,, | Performed by: FAMILY MEDICINE

## 2025-06-25 PROCEDURE — 3074F SYST BP LT 130 MM HG: CPT | Mod: CPTII,S$GLB,, | Performed by: FAMILY MEDICINE

## 2025-06-25 PROCEDURE — 4010F ACE/ARB THERAPY RXD/TAKEN: CPT | Mod: CPTII,S$GLB,, | Performed by: FAMILY MEDICINE

## 2025-06-27 NOTE — PROGRESS NOTES
SUBJECTIVE:    Patient ID: Ember Delgado is a 64 y.o. female.    Chief Complaint: Annual Exam (Annual visit//no bottles// no refills needed//pt states lately she's been bruising easily//pt agrees to mammogram )    HPI    History of Present Illness    CHIEF COMPLAINT:  Ember presents today for follow up    EASY BRUISING:  She reports increased tendency for easy bruising, noting that minor impacts such as hitting a door while carrying something result in bruising. She expresses concern about potential relationship to blood pressure medication. She denies being on anticoagulants or aspirin and denies additional symptoms of bleeding or prolonged bruising.    MEDICAL HISTORY:  She has a history of  lung tumor in 2010 treated with chemotherapy for two weeks and primarily radiation with no current issues related to prior treatment. She underwent tubal ligation with ovaries and uterus intact. Menopause onset occurred at age 49 and she is currently 64 years old.    ROUTINE HEALTH MONITORING:  She undergoes labs every three months with Dr. Jonas, specifically monitoring thyroid function. Mammograms are performed with routine ultrasound follow-up, focusing on a stable finding in the right breast that has not changed in size. Last colonoscopy was completed in 2021 with next screening scheduled for 2026 on a five-year plan.    REVIEW OF SYSTEMS:  She denies chest pain or shortness of breath with exertion. She reports occasional knee discomfort when rising and endorses frequent burping but no other digestive issues. She denies urinary problems including burning, kidney stones, or bladder infections.      ROS:  Constitutional: -appetite change, -chills, -fatigue, -fever, -unexpected weight change  HENT: -ear pain, -trouble swallowing  Eyes: -pain, -discharge, -visual disturbance  Respiratory: -apnea, -cough, -shortness of breath, -wheezing  Cardiovascular: -chest pain, -leg swelling  Gastrointestinal: -abdominal  pain, -blood in stool, -constipation, -diarrhea, -nausea, -vomiting, -reflux, +indigestion  Endocrine: -cold intolerance, -heat intolerance, -polydipsia  Genitourinary: -bladder incontinence, -dysuria, -erectile dysfunction, -frequency, -hematuria, -testicular pain, -urgency, -nocturia  Musculoskeletal: -gait problem, -joint swelling, -myalgia, +limb pain, +pain with movement  Neurological: -dizziness, -seizures, -numbness  Psychiatric/Behavioral: -agitation, -hallucinations, -nervous, -anxiety symptoms  Skin: +easy bruising         No visits with results within 6 Month(s) from this visit.   Latest known visit with results is:   Orders Only on 06/15/2023   Component Date Value Ref Range Status    Cholesterol 06/15/2023 195  <200 mg/dL Final    HDL 06/15/2023 58  > OR = 50 mg/dL Final    Triglycerides 06/15/2023 81  <150 mg/dL Final    LDL Cholesterol 06/15/2023 119 (H)  mg/dL (calc) Final    HDL/Cholesterol Ratio 06/15/2023 3.4  <5.0 (calc) Final    Non HDL Chol. (LDL+VLDL) 06/15/2023 137 (H)  <130 mg/dL (calc) Final    Glucose 06/15/2023 91  65 - 99 mg/dL Final    BUN 06/15/2023 23  7 - 25 mg/dL Final    Creatinine 06/15/2023 0.71  0.50 - 1.05 mg/dL Final    eGFR 06/15/2023 96  > OR = 60 mL/min/1.73m2 Final    BUN/Creatinine Ratio 06/15/2023 NOT APPLICABLE  6 - 22 (calc) Final    Sodium 06/15/2023 140  135 - 146 mmol/L Final    Potassium 06/15/2023 5.2  3.5 - 5.3 mmol/L Final    Chloride 06/15/2023 103  98 - 110 mmol/L Final    CO2 06/15/2023 29  20 - 32 mmol/L Final    Calcium 06/15/2023 9.2  8.6 - 10.4 mg/dL Final    Total Protein 06/15/2023 6.8  6.1 - 8.1 g/dL Final    Albumin 06/15/2023 4.5  3.6 - 5.1 g/dL Final    Globulin, Total 06/15/2023 2.3  1.9 - 3.7 g/dL (calc) Final    Albumin/Globulin Ratio 06/15/2023 2.0  1.0 - 2.5 (calc) Final    Total Bilirubin 06/15/2023 0.5  0.2 - 1.2 mg/dL Final    Alkaline Phosphatase 06/15/2023 69  37 - 153 U/L Final    AST 06/15/2023 14  10 - 35 U/L Final    ALT 06/15/2023 14   6 - 29 U/L Final    Creatinine, Urine 06/15/2023 86  20 - 275 mg/dL Final    Microalb, Ur 06/15/2023 0.2  See Note: mg/dL Final    Microalb/Creat Ratio 06/15/2023 2  <30 mcg/mg creat Final    Color, UA 06/15/2023 YELLOW  YELLOW Final    Appearance, UA 06/15/2023 CLEAR  CLEAR Final    Specific Gravity, UA 06/15/2023 1.020  1.001 - 1.035 Final    pH, UA 06/15/2023 6.0  5.0 - 8.0 Final    Glucose, UA 06/15/2023 NEGATIVE  NEGATIVE Final    Bilirubin, UA 06/15/2023 NEGATIVE  NEGATIVE Final    Ketones, UA 06/15/2023 NEGATIVE  NEGATIVE Final    Occult Blood UA 06/15/2023 NEGATIVE  NEGATIVE Final    Protein, UA 06/15/2023 NEGATIVE  NEGATIVE Final    Nitrite, UA 06/15/2023 NEGATIVE  NEGATIVE Final    Leukocytes, UA 06/15/2023 NEGATIVE  NEGATIVE Final    WBC Casts, UA 06/15/2023 NONE SEEN  < OR = 5 /HPF Final    RBC Casts, UA 06/15/2023 NONE SEEN  < OR = 2 /HPF Final    Squam Epithel, UA 06/15/2023 NONE SEEN  < OR = 5 /HPF Final    Bacteria, UA 06/15/2023 NONE SEEN  NONE SEEN /HPF Final    Hyaline Casts, UA 06/15/2023 NONE SEEN  NONE SEEN /LPF Final    Service Cmt: 06/15/2023    Final    Reflexive Urine Culture 06/15/2023    Final    WBC 06/15/2023 6.3  3.8 - 10.8 Thousand/uL Final    RBC 06/15/2023 4.92  3.80 - 5.10 Million/uL Final    Hemoglobin 06/15/2023 14.0  11.7 - 15.5 g/dL Final    Hematocrit 06/15/2023 42.4  35.0 - 45.0 % Final    MCV 06/15/2023 86.2  80.0 - 100.0 fL Final    MCH 06/15/2023 28.5  27.0 - 33.0 pg Final    MCHC 06/15/2023 33.0  32.0 - 36.0 g/dL Final    RDW 06/15/2023 13.8  11.0 - 15.0 % Final    Platelets 06/15/2023 258  140 - 400 Thousand/uL Final    MPV 06/15/2023 9.9  7.5 - 12.5 fL Final    Neutrophils, Abs 06/15/2023 3,421  1,500 - 7,800 cells/uL Final    Lymph # 06/15/2023 2,048  850 - 3,900 cells/uL Final    Mono # 06/15/2023 504  200 - 950 cells/uL Final    Eos # 06/15/2023 290  15 - 500 cells/uL Final    Baso # 06/15/2023 38  0 - 200 cells/uL Final    Neutrophils Relative 06/15/2023 54.3  %  Final    Lymph % 06/15/2023 32.5  % Final    Mono % 06/15/2023 8.0  % Final    Eosinophil % 06/15/2023 4.6  % Final    Basophil % 06/15/2023 0.6  % Final    TSH 06/15/2023 0.69  0.40 - 4.50 mIU/L Final       Past Medical History:   Diagnosis Date    Essential hypertension, malignant     Hypothyroid     Lung cancer     Primary cancer of right upper lobe of lung 6/27/2017    Stage IIIA NSCLC with sarcomatoid features.  Received neoadjuvant Cisplstin/Etop/XRT Surgery 2/2010. Patient had been in remission since that time.      Social History[1]  Past Surgical History:   Procedure Laterality Date    APPENDECTOMY      HYSTERECTOMY      Right upper lobectomy.  Right     TUBAL LIGATION Bilateral      No family history on file.    The 10-year CVD risk score (ANG'Toñoino, et al., 2008) is: 4%    Values used to calculate the score:      Age: 64 years      Sex: Female      Diabetic: No      Tobacco smoker: No      Systolic Blood Pressure: 102 mmHg      Is BP treated: No      HDL Cholesterol: 58 mg/dL      Total Cholesterol: 195 mg/dL    All of your core healthy metrics are met.      Review of patient's allergies indicates:   Allergen Reactions    Flexeril [cyclobenzaprine] Hives     Current Medications[2]    Review of Systems   Constitutional:  Negative for activity change and unexpected weight change.   HENT:  Negative for hearing loss, rhinorrhea and trouble swallowing.    Eyes:  Negative for discharge and visual disturbance.   Respiratory:  Negative for chest tightness and wheezing.    Cardiovascular:  Negative for chest pain and palpitations.   Gastrointestinal:  Negative for blood in stool, constipation, diarrhea and vomiting.   Endocrine: Negative for polydipsia and polyuria.   Genitourinary:  Negative for difficulty urinating, dysuria, hematuria and menstrual problem.   Musculoskeletal:  Negative for arthralgias, joint swelling and neck pain.   Neurological:  Negative for weakness and headaches.   Psychiatric/Behavioral:   Negative for confusion and dysphoric mood.            Objective:      Vitals:    06/25/25 1137   BP: 102/60   Pulse: 78   Resp: 18   SpO2: 97%   Weight: 58.1 kg (128 lb)   Height: 5' (1.524 m)     Physical Exam  Vitals and nursing note reviewed.   Constitutional:       General: She is not in acute distress.     Appearance: Normal appearance. She is well-developed. She is not toxic-appearing.   HENT:      Head: Normocephalic and atraumatic.      Right Ear: Tympanic membrane and external ear normal.      Left Ear: Tympanic membrane and external ear normal.      Nose: Nose normal.      Mouth/Throat:      Pharynx: Oropharynx is clear. No posterior oropharyngeal erythema.   Eyes:      Pupils: Pupils are equal, round, and reactive to light.   Neck:      Thyroid: No thyromegaly.      Vascular: No carotid bruit.   Cardiovascular:      Rate and Rhythm: Normal rate and regular rhythm.      Heart sounds: Normal heart sounds. No murmur heard.  Pulmonary:      Effort: Pulmonary effort is normal.      Breath sounds: Normal breath sounds. No wheezing or rales.   Abdominal:      General: Bowel sounds are normal. There is no distension.      Palpations: Abdomen is soft.      Tenderness: There is no abdominal tenderness.   Musculoskeletal:         General: No tenderness or deformity. Normal range of motion.      Cervical back: Normal range of motion and neck supple.      Lumbar back: Normal. No spasms.      Comments: Bends 90 degrees at  waist, shoulders and knees have full range of motion, no pitting edema to lower extremities   Lymphadenopathy:      Cervical: No cervical adenopathy.   Skin:     General: Skin is warm and dry.      Findings: No rash.   Neurological:      General: No focal deficit present.      Mental Status: She is alert and oriented to person, place, and time. Mental status is at baseline.      Cranial Nerves: No cranial nerve deficit.      Coordination: Coordination normal.   Psychiatric:         Mood and Affect:  Mood normal.         Behavior: Behavior normal.         Thought Content: Thought content normal.         Judgment: Judgment normal.       Physical Exam                  Assessment:       1. Essential hypertension    2. Other screening mammogram    3. Fibrocystic changes of right breast    4. Mixed hyperlipidemia    5. Anxiety    6. Menopause    7. History of lung cancer    8. Acquired hypothyroidism         Plan:       Essential hypertension  Losartan 25 mg working well  Other screening mammogram  -     Mammo Digital Screening Bilat w/ Jeison (XPD); Future; Expected date: 08/15/2025  Due for another mammogram and ultrasound  Fibrocystic changes of right breast  -     US Breast Right Limited; Future; Expected date: 08/14/2025    Mixed hyperlipidemia  Will get copy of Dr. Ortega's recent labs for evaluation  Anxiety  Lexapro 20 mg half tablet daily working well continue venlafaxine 37.5 mg daily for hot flashes  Menopause  Venlafaxine 37.5 mg daily  History of lung cancer  Doing well, in remission follows up with Dr. Ferrara  Acquired hypothyroidism  Levothyroxine 100 mcg daily    Assessment & Plan    N60.11 Diffuse cystic mastopathy of right breast    FIBROCYSTIC BREAST CHANGES:  - Ordered mammogram and right breast ultrasound for August to continue monitoring fibrocystic changes.    CANCER HISTORY AND FOLLOW-UP:  - Evaluated history of cancer (tumor in 2010) and subsequent treatment with chemo and radiation.  - Considered potential thyroid issues related to past radiation therapy.  - Reviewed current health maintenance practices, including regular labs and mammograms.  - Referred to Dr. Montero or [facility] .  [facility] G [facility] in [place] for GYN follow-up due to age and cancer history, specifically to examine ovaries.    MENOPAUSAL CHANGES:  - Attributed bruising concerns to menopausal changes and explained that easy bruising is likely due to menopause-related skin changes rather than BP medication.  - Discussed the ongoing  nature of menopause and its effects on skin sensitivity, including the potential for visible veins as skin continues to thin with age.    GENERAL HEALTH MAINTENANCE:  - Assessed overall health status, noting good physical activity level and no reported chest pain or heart problems during exertion.  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    SUPPLEMENTS AND VITAMINS:  - Started chewable or gummy vitamin C, 500 mg daily, to potentially reduce bruising and strengthen capillaries.  - Started calcium plus vitamin D supplement, to be taken twice daily for bone health.         Follow up in about 1 year (around 6/25/2026).        This note was generated with the assistance of ambient listening technology. Verbal consent was obtained by the patient and accompanying visitor(s) for the recording of patient appointment to facilitate this note. I attest to having reviewed and edited the generated note for accuracy, though some syntax or spelling errors may persist. Please contact the author of this note for any clarification.      6/26/2025 Jared Leon           [1]   Social History  Socioeconomic History    Marital status: Significant Other   Tobacco Use    Smoking status: Former     Current packs/day: 0.00     Types: Cigarettes     Quit date: 2010     Years since quitting: 15.4    Smokeless tobacco: Never   Substance and Sexual Activity    Alcohol use: No    Drug use: No     Social Drivers of Health     Financial Resource Strain: Low Risk  (6/13/2024)    Overall Financial Resource Strain (CARDIA)     Difficulty of Paying Living Expenses: Not very hard   Food Insecurity: No Food Insecurity (6/13/2024)    Hunger Vital Sign     Worried About Running Out of Food in the Last Year: Never true     Ran Out of Food in the Last Year: Never true   Transportation Needs: No  Transportation Needs (1/23/2024)    PRAPARE - Transportation     Lack of Transportation (Medical): No     Lack of Transportation (Non-Medical): No   Physical Activity: Insufficiently Active (6/13/2024)    Exercise Vital Sign     Days of Exercise per Week: 2 days     Minutes of Exercise per Session: 30 min   Stress: No Stress Concern Present (6/13/2024)    [employer] [place] [employer] - Occupational Stress Questionnaire     Feeling of Stress : Only a little   Housing Stability: Low Risk  (1/23/2024)    Housing Stability Vital Sign     Unable to Pay for Housing in the Last Year: No     Number of Places Lived in the Last Year: 1     Unstable Housing in the Last Year: No   [2]   Current Outpatient Medications:     levothyroxine (SYNTHROID) 100 MCG tablet, Take 1 tablet (100 mcg total) by mouth once daily., Disp: 90 tablet, Rfl: 1    losartan (COZAAR) 25 MG tablet, Take 1 tablet (25 mg total) by mouth once daily., Disp: 90 tablet, Rfl: 3    multivitamin (THERAGRAN) per tablet, Take 1 tablet by mouth., Disp: , Rfl:     rosuvastatin (CRESTOR) 5 MG tablet, Take 1 tablet (5 mg total) by mouth 3 (three) times a week., Disp: 90 tablet, Rfl: 3    amino acids (AMINO ACID ORAL), Take by mouth. (Patient not taking: Reported on 6/25/2025), Disp: , Rfl:     EScitalopram oxalate (LEXAPRO) 20 MG tablet, Take 0.5 tablets (10 mg total) by mouth once daily., Disp: 45 tablet, Rfl: 0    omega-3/dha/epa/dpa/fish oil (OMEGA-3 2100 ORAL), Take by mouth. (Patient not taking: Reported on 6/25/2025), Disp: , Rfl:     UBIQUINOL, BULK, MISC, by Misc.(Non-Drug; Combo Route) route. (Patient not taking: Reported on 6/25/2025), Disp: , Rfl:     venlafaxine (EFFEXOR-XR) 37.5 MG 24 hr capsule, Take 1 capsule (37.5 mg total) by mouth once daily., Disp: 30 capsule, Rfl: 3

## 2025-08-04 DIAGNOSIS — I10 ESSENTIAL HYPERTENSION: ICD-10-CM

## 2025-08-04 RX ORDER — LOSARTAN POTASSIUM 25 MG/1
25 TABLET ORAL DAILY
Qty: 90 TABLET | Refills: 3 | Status: SHIPPED | OUTPATIENT
Start: 2025-08-04 | End: 2026-07-30

## 2025-08-04 NOTE — TELEPHONE ENCOUNTER
----- Message from Bere sent at 8/4/2025  8:03 AM CDT -----  - 8/1-1:15 pm- pt needs refill on blood pressure medication. She was just seen so not sure why there are not refills   [facility]   [phone]

## 2025-08-12 ENCOUNTER — OFFICE VISIT (OUTPATIENT)
Facility: CLINIC | Age: 65
End: 2025-08-12
Payer: MEDICARE

## 2025-08-12 VITALS
TEMPERATURE: 98 F | BODY MASS INDEX: 24.9 KG/M2 | WEIGHT: 127.5 LBS | RESPIRATION RATE: 18 BRPM | HEART RATE: 75 BPM | DIASTOLIC BLOOD PRESSURE: 61 MMHG | SYSTOLIC BLOOD PRESSURE: 125 MMHG

## 2025-08-12 DIAGNOSIS — Z85.118 HISTORY OF LUNG CANCER: Primary | ICD-10-CM

## 2025-08-12 PROCEDURE — 99999 PR PBB SHADOW E&M-EST. PATIENT-LVL III: CPT | Mod: PBBFAC,,, | Performed by: INTERNAL MEDICINE

## 2025-08-18 ENCOUNTER — HOSPITAL ENCOUNTER (OUTPATIENT)
Dept: RADIOLOGY | Facility: HOSPITAL | Age: 65
Discharge: HOME | End: 2025-08-18
Attending: INTERNAL MEDICINE
Payer: MEDICARE

## 2025-08-18 ENCOUNTER — HOSPITAL ENCOUNTER (OUTPATIENT)
Dept: RADIOLOGY | Facility: HOSPITAL | Age: 65
Discharge: HOME | End: 2025-08-18
Attending: FAMILY MEDICINE
Payer: MEDICARE

## 2025-08-18 DIAGNOSIS — Z12.31 OTHER SCREENING MAMMOGRAM: ICD-10-CM

## 2025-08-18 DIAGNOSIS — Z85.118 HISTORY OF LUNG CANCER: ICD-10-CM

## 2025-08-18 PROCEDURE — 71250 CT THORAX DX C-: CPT | Mod: 26,,, | Performed by: RADIOLOGY

## 2025-08-18 PROCEDURE — 77063 BREAST TOMOSYNTHESIS BI: CPT | Mod: TC,PO

## 2025-08-18 PROCEDURE — 77067 SCR MAMMO BI INCL CAD: CPT | Mod: 26,,, | Performed by: RADIOLOGY

## 2025-08-18 PROCEDURE — 71250 CT THORAX DX C-: CPT | Mod: TC,PO

## 2025-08-18 PROCEDURE — 77063 BREAST TOMOSYNTHESIS BI: CPT | Mod: 26,,, | Performed by: RADIOLOGY
